# Patient Record
Sex: FEMALE | Race: WHITE | NOT HISPANIC OR LATINO | Employment: FULL TIME | URBAN - METROPOLITAN AREA
[De-identification: names, ages, dates, MRNs, and addresses within clinical notes are randomized per-mention and may not be internally consistent; named-entity substitution may affect disease eponyms.]

---

## 2017-04-10 ENCOUNTER — APPOINTMENT (OUTPATIENT)
Dept: LAB | Age: 57
End: 2017-04-10
Attending: PREVENTIVE MEDICINE

## 2017-04-10 ENCOUNTER — TRANSCRIBE ORDERS (OUTPATIENT)
Dept: ADMINISTRATIVE | Age: 57
End: 2017-04-10

## 2017-04-10 DIAGNOSIS — Z02.1 PRE-EMPLOYMENT HEALTH SCREENING EXAMINATION: Primary | ICD-10-CM

## 2017-04-10 DIAGNOSIS — Z02.1 PRE-EMPLOYMENT HEALTH SCREENING EXAMINATION: ICD-10-CM

## 2017-04-10 PROCEDURE — 86762 RUBELLA ANTIBODY: CPT

## 2017-04-10 PROCEDURE — 86787 VARICELLA-ZOSTER ANTIBODY: CPT

## 2017-04-10 PROCEDURE — 36415 COLL VENOUS BLD VENIPUNCTURE: CPT

## 2017-04-10 PROCEDURE — 86765 RUBEOLA ANTIBODY: CPT

## 2017-04-10 PROCEDURE — 86735 MUMPS ANTIBODY: CPT

## 2017-04-10 PROCEDURE — 86480 TB TEST CELL IMMUN MEASURE: CPT

## 2017-04-11 LAB
MEV IGG SER QL: ABNORMAL
MUV IGG SER QL: NORMAL
RUBV IGG SERPL IA-ACNC: <0.2 IU/ML
VZV IGG SER IA-ACNC: NORMAL

## 2017-04-12 LAB
ANNOTATION COMMENT IMP: NORMAL
GAMMA INTERFERON BACKGROUND BLD IA-ACNC: 0.06 IU/ML
M TB IFN-G BLD-IMP: NEGATIVE
M TB IFN-G CD4+ BCKGRND COR BLD-ACNC: 0.05 IU/ML
M TB IFN-G CD4+ T-CELLS BLD-ACNC: 0.11 IU/ML
MITOGEN IGNF BLD-ACNC: >10 IU/ML
QUANTIFERON-TB GOLD IN TUBE: NORMAL
SERVICE CMNT-IMP: NORMAL

## 2017-06-02 ENCOUNTER — TRANSCRIBE ORDERS (OUTPATIENT)
Dept: LAB | Facility: HOSPITAL | Age: 57
End: 2017-06-02

## 2017-06-02 DIAGNOSIS — Z00.8 ENCOUNTER FOR OTHER GENERAL EXAMINATION: Primary | ICD-10-CM

## 2017-06-05 ENCOUNTER — APPOINTMENT (OUTPATIENT)
Dept: LAB | Facility: HOSPITAL | Age: 57
End: 2017-06-05

## 2017-06-05 DIAGNOSIS — Z00.8 ENCOUNTER FOR OTHER GENERAL EXAMINATION: ICD-10-CM

## 2017-06-05 LAB
CHOLEST SERPL-MCNC: 224 MG/DL (ref 50–200)
EST. AVERAGE GLUCOSE BLD GHB EST-MCNC: 126 MG/DL
HBA1C MFR BLD: 6 % (ref 4.2–6.3)
HDLC SERPL-MCNC: 55 MG/DL (ref 40–60)
LDLC SERPL CALC-MCNC: 136 MG/DL (ref 0–100)
TRIGL SERPL-MCNC: 165 MG/DL

## 2017-06-05 PROCEDURE — 83036 HEMOGLOBIN GLYCOSYLATED A1C: CPT

## 2017-06-05 PROCEDURE — 80061 LIPID PANEL: CPT

## 2017-06-05 PROCEDURE — 36415 COLL VENOUS BLD VENIPUNCTURE: CPT

## 2018-01-11 NOTE — MISCELLANEOUS
Message  spoke to pt re  her varicella zoster igm ab which is elevated  Pt does not have any rash or symptoms   She will call hospice house who has ordered the tests  She will let me know if she needs me to speak with them      Signatures   Electronically signed by : Sandra Blackwood MD; Jan 29 2016  5:41PM EST                       (Author)

## 2018-01-15 NOTE — PROGRESS NOTES
Assessment    1  Encounter for screening mammogram for malignant neoplasm of breast (V76 12)   (Z12 31)   2  Encounter for screening colonoscopy (V76 51) (Z12 11)   3  H/O: upper GI bleed (V12 79) (Z87 19)   4  Fatigue (780 79) (R53 83)   5  Screening for lipoid disorders (V77 91) (Z13 220)   6  Pap smear for cervical cancer screening (V76 2) (Z12 4)   7  Encounter for preventive health examination (V70 0) (Z00 00)   8  History of Chest tightness (786 59) (R07 89)    Plan  Encounter for screening colonoscopy    · 2 - Zack Ac MD, Shaila Ac  Colorectal Surgery, Gastroenterology Physician Referral   Consult  Status: Active  Requested for: 75CLR5384  Care Summary provided  : Yes  Encounter for screening mammogram for malignant neoplasm of breast    · * MAMMO SCREENING BILATERAL W CAD; Status:Active; Requested for:15Jan2016;   Fatigue    · (1) CBC/PLT/DIFF; Status:Active; Requested for:15Jan2016;    · (1) COMPREHENSIVE METABOLIC PANEL; Status:Active; Requested for:15Jan2016;    · (1) TSH WITH FT4 REFLEX; Status:Active; Requested for:15Jan2016;    · (1) VITAMIN D 25-HYDROXY; Status:Active; Requested for:15Jan2016;    Health Maintenance    · Begin a limited exercise program ; Status:Complete;   Done: 94GWV7900   · Drink plenty of fluids ; Status:Complete;   Done: 68PAL7874   · Regular aerobic exercise can help reduce stress ; Status:Complete;   Done: 12VRF6230   · Use a sun block product with an SPF of 15 or more ; Status:Complete;   Done:  03IBF1924   · We recommend a colonoscopy ; Status:Complete;   Done: 94UXO9928   · We recommend routine visits to a dentist ; Status:Complete;   Done: 02WDR4984   · Call [phone] if: You find a new or different kind of lump in your breast ;  Status:Complete;   Done: 85PYA8266   · Call [phone] if: You have any bleeding from the vagina ; Status:Complete;   Done:  82DCD5374   · Call [phone] if: You have any warning signs of skin cancer ; Status:Complete;    Done: 39TIA5485   · Call 911 if: You experience a new kind of chest pain (angina) or pressure ;  Status:Complete;   Done: 85DEU3534  Pap smear for cervical cancer screening    · 1 - Alicia Gallo MD, Alicia Avendaño  (Obstetrics/Gynecology) Physician Referral  Consult   Status: Active  Requested for: 53AFS1774  Care Summary provided  : Yes  Screening for lipoid disorders    · (1) LIPID PANEL FASTING W DIRECT LDL REFLEX; Status:Active; Requested  for:39Kwd3194;     Discussion/Summary  Discussion Summary:   64-year-old female here to establish as a new patient  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 [employer]  She will get mMR, varicella immune testing done  She will get PPD placement at the hospice house  Patient declines flu and tdap vaccines    Re  h/o intermittent chest tightness, pt exam wnl today  Declines ekg or w/u today  Pt states she will schedule a f/u appt for exam with cardiologist at [facility] who she si established with  [facility] cp, sob     Re RHM: script for routine bw and mammogram provided  Gwen Choe ddition, referral to [facility] provided  no prior initial screening colonoscopy, I have provided a referral  Care guide provided  c/w regular exercise, well balanced healthy diet  Pt is currently , going through a divorce, states this is good for her    Followup once bloodwork done  will complete hospice house volunteer paperwork  Counseling Documentation With Imm: The patient was counseled regarding diagnostic results, instructions for management, risk factor reductions, prognosis, patient and family education, impressions, risks and benefits of treatment options, importance of compliance with treatment  Medication SE Review and Pt Understands Tx: Possible side effects of new medications were reviewed with the patient/guardian today  The treatment plan was reviewed with the patient/guardian   The patient/guardian understands and agrees with the treatment plan      Chief Complaint  Chief Complaint Free Text Note Form: new pt to be estab , never had colonoscopy , not up to date with gyn and mammo for a few years      History of Present Illness  HPI: 44-year-old female here to establish as a new patient  last exam, 2009    pt has a h/o systolic click  has seen cardiology assoc at [facility], did stress test 2009, tachycardia set in  Will f/u and schedule appt with cardio for a routine f/u  exercises regularly    gets a constriction/tightness in upper mid chest area, can occur at rest or during activity for past few months  Pt has occasional wheezing  Pt declines ekg    will be volunteering with [facility], needs paperwork completed  Has a script to get bw done for mmr and varicella immunity  She will get ppd placed at the hospice house  Decline tdap and flu vaccines    No previous colonoscopy  No recent mammogram   HM, Adult Female: The patient is being seen for a health maintenance evaluation  General Health: The patient's health since the last visit is described as good  She has regular dental visits  She denies vision problems  Lifestyle:  She consumes a diverse and healthy diet  She exercises regularly  She does not use tobacco  She denies alcohol use  She denies drug use  Reproductive health: the patient is postmenopausal    Screening:      Review of Systems  Complete-Female:   Constitutional: no fever and no chills  Cardiovascular: as noted in HPI, no chest pain and no palpitations  Respiratory: wheezing, but as noted in HPI and no shortness of breath  Past Medical History    1  History of Chest tightness (786 59) (R07 89)   2  H/O: upper GI bleed (V12 79) (Z87 19)   3  History of Systolic click (237 3) (O69 8)    Family History    1  Family history of lung cancer (V16 1) (Z80 1)   2  Family history of Mitral valve prolapse    Social History    · Caffeine use (V49 89) (F15 90)   · Non-smoker (V49 89) (Z78 9)   · Social alcohol use (F10 99)    Current Meds   1  Multivitamins Oral Capsule;    Therapy: 28VOM4508 to Recorded   2  No Reported Medications Recorded    Allergies    1  No Known Drug Allergies    Vitals  Vital Signs [Data Includes: Current Encounter]    Recorded: [id number] 09:56AM Recorded: 03DOG8143 09:51AM   Temperature 97 4 F    Heart Rate 72    Respiration 16    Systolic 565    Diastolic 78    Height 5 ft 2 in 5 ft 2 in   Weight 132 lb  132 lb    BMI Calculated 24 14 24 14   BSA Calculated 1 6 1 6     Physical Exam    Constitutional   General appearance: No acute distress, well appearing and well nourished  Eyes   Conjunctiva and lids: No swelling, erythema or discharge  Ears, Nose, Mouth, and Throat   Oropharynx: Normal with no erythema, edema, exudate or lesions  Pulmonary   Respiratory effort: No increased work of breathing or signs of respiratory distress  Auscultation of lungs: Clear to auscultation  Cardiovascular   Auscultation of heart: Normal rate and rhythm, normal S1 and S2, without murmurs  Examination of extremities for edema and/or varicosities: Normal     Carotid pulses: Normal     Abdomen   Abdomen: Non-tender, no masses  Liver and spleen: No hepatomegaly or splenomegaly  Lymphatic   Palpation of lymph nodes in neck: No lymphadenopathy  Skin   Skin and subcutaneous tissue: Normal without rashes or lesions  Neurologic   Cranial nerves: Cranial nerves 2-12 intact      Psychiatric   Orientation to person, place, and time: Normal     Mood and affect: Normal          Signatures   Electronically signed by : Rei Bartlett MD; Jan 16 2016  3:14PM EST                       (Author)

## 2018-02-18 ENCOUNTER — APPOINTMENT (EMERGENCY)
Dept: RADIOLOGY | Facility: HOSPITAL | Age: 58
End: 2018-02-18
Payer: COMMERCIAL

## 2018-02-18 ENCOUNTER — HOSPITAL ENCOUNTER (EMERGENCY)
Facility: HOSPITAL | Age: 58
Discharge: HOME | End: 2018-02-18
Attending: EMERGENCY MEDICINE | Admitting: EMERGENCY MEDICINE
Payer: COMMERCIAL

## 2018-02-18 VITALS
DIASTOLIC BLOOD PRESSURE: 74 MMHG | OXYGEN SATURATION: 97 % | RESPIRATION RATE: 20 BRPM | BODY MASS INDEX: 22.45 KG/M2 | HEIGHT: 62 IN | TEMPERATURE: 98.3 F | WEIGHT: 122 LBS | HEART RATE: 79 BPM | SYSTOLIC BLOOD PRESSURE: 144 MMHG

## 2018-02-18 DIAGNOSIS — S93.601A SPRAIN OF RIGHT FOOT, INITIAL ENCOUNTER: Primary | ICD-10-CM

## 2018-02-18 PROCEDURE — 73630 X-RAY EXAM OF FOOT: CPT

## 2018-02-18 PROCEDURE — 99283 EMERGENCY DEPT VISIT LOW MDM: CPT

## 2018-02-18 RX ORDER — ACETAMINOPHEN 325 MG/1
975 TABLET ORAL ONCE
Status: DISCONTINUED | OUTPATIENT
Start: 2018-02-18 | End: 2018-02-18 | Stop reason: HOSPADM

## 2018-02-19 NOTE — PROGRESS NOTES
Patient returned the phone call  I explained her that she does have a fracture in her foot  I asked her to return for splint and follow-up Podiatry  She states she feels very comfortable in a shoe and is nonweightbearing with crutches would not like to return at this time but will called podiatrist listed for follow-up the next few days  I explained to her that any time if she has increased pain to return and she would like a splint she return any time she understands and agrees to treatment plan

## 2018-02-19 NOTE — DISCHARGE INSTRUCTIONS
Foot Sprain   WHAT YOU NEED TO KNOW:   A foot sprain is caused by a stretched or torn ligament in the foot or toe  Ligaments are tough tissues that connect bones  DISCHARGE INSTRUCTIONS:   Seek care immediately if:   · You have numbness or tingling below the injury, such as in your toes  · The skin on your injured foot is blue or pale  · You have increased pain, even after you take pain medicine  Contact your healthcare provider if:   · You have new weakness in your foot  · You have new or increased swelling in your foot  · You have new or increased stiffness when you move your injured foot  · You have questions or concerns about your condition or care  Medicines:   · NSAIDs , such as ibuprofen, help decrease swelling, pain, and fever  This medicine is available with or without a doctor's order  NSAIDs can cause stomach bleeding or kidney problems in certain people  If you take blood thinner medicine, always ask if NSAIDs are safe for you  Always read the medicine label and follow directions  Do not give these medicines to children under 10months of age without direction from your child's healthcare provider  · Take your medicine as directed  Contact your healthcare provider if you think your medicine is not helping or if you have side effects  Tell him of her if you are allergic to any medicine  Keep a list of the medicines, vitamins, and herbs you take  Include the amounts, and when and why you take them  Bring the list or the pill bottles to follow-up visits  Carry your medicine list with you in case of an emergency  Self-care:   · Rest your foot  Limit movement in your sprained foot for the first 2 to 3 days  You might need crutches to take weight off your injured foot as it heals  Use crutches as directed  · Apply ice  on your foot for 15 to 20 minutes every hour or as directed  Use an ice pack, or put crushed ice in a plastic bag  Cover it with a towel   Ice helps prevent tissue damage and decreases swelling and pain  · Compress your foot  You may need to use tape or an elastic bandage to support your foot if you have a mild sprain  You may need a splint on your foot for support if your sprain is severe  Wear your splint for as many days as directed  · Elevate your foot  above the level of your heart as often as you can  This will help decrease swelling and pain  Prop your foot on pillows or blankets to keep it elevated comfortably  Exercise your foot:  You may be given exercises to improve your strength and to help decrease stiffness  The exercises and physical therapy can help restore strength and increase the range of motion in your foot  Ask your healthcare provider when you can return to your normal activities or play sports  Prevent another foot sprain:   · Warm up and stretch before you exercise  · Do not exercise when you feel pain or are tired  · Wear equipment to protect yourself when you play sports  Follow up with your healthcare provider as directed:  Write down your questions so you remember to ask them during your visits  © 2017 [employer] Information is for End User's use only and may not be sold, redistributed or otherwise used for commercial purposes  All illustrations and images included in CareNotes® are the copyrighted property of [employer]  or [address]  The above information is an  only  It is not intended as medical advice for individual conditions or treatments  Talk to your doctor, nurse or pharmacist before following any medical regimen to see if it is safe and effective for you

## 2018-02-20 NOTE — ED PROVIDER NOTES
History  Chief Complaint   Patient presents with    Foot Injury     pt states she tripped over her dog last night  c/o right foot pain     60-year-old female presents to the emergency department with complaints of right-sided foot pain  States she tripped over her dog last night and twisted her right foot  States she has had pain with ambulation and some swelling noted in the area since that time  Is using crutches to help with ambulation  Taking over-the-counter medications at home without relief of symptoms  No previous injury to this foot  States she is unable        History provided by:  Patient   used: No        None       No past medical history on file  No past surgical history on file  No family history on file  I have reviewed and agree with the history as documented  Social History   Substance Use Topics    Smoking status: Not on file    Smokeless tobacco: Not on file    Alcohol use Not on file        Review of Systems   Constitutional: Negative for chills and fever  HENT: Negative for congestion, dental problem and sore throat  Respiratory: Negative for cough  Cardiovascular: Negative for chest pain  Gastrointestinal: Negative for abdominal pain  Musculoskeletal: Negative for back pain and neck pain  Foot pain     Skin: Negative for rash and wound  All other systems reviewed and are negative  Physical Exam  ED Triage Vitals [02/18/18 1920]   Temperature Pulse Respirations Blood Pressure SpO2   98 3 °F (36 8 °C) 79 20 144/74 97 %      Temp Source Heart Rate Source Patient Position - Orthostatic VS BP Location FiO2 (%)   Oral Monitor Sitting Left arm --      Pain Score       --           Orthostatic Vital Signs  Vitals:    02/18/18 1920   BP: 144/74   Pulse: 79   Patient Position - Orthostatic VS: Sitting       Physical Exam   Constitutional: She is oriented to person, place, and time   Vital signs are normal  She appears well-developed and well-nourished  HENT:   Head: Normocephalic and atraumatic  Cardiovascular: Normal rate and regular rhythm  Pulmonary/Chest: Effort normal and breath sounds normal  No respiratory distress  She has no wheezes  She has no rhonchi  She has no rales  Musculoskeletal:        Right foot: There is decreased range of motion, tenderness, bony tenderness and swelling  There is normal capillary refill, no crepitus, no deformity and no laceration  Feet:    Neurological: She is alert and oriented to person, place, and time  Skin: Skin is warm and dry  Psychiatric: She has a normal mood and affect  Her behavior is normal    Nursing note and vitals reviewed  ED Medications  Medications - No data to display    Diagnostic Studies  Results Reviewed     None                 XR foot 3+ views RIGHT   ED Interpretation by Malissa Thorne PA-C (02/18 2116)   No fracture      Final Result by Jose Matt DO (02/19 1019)   Nondisplaced fractures bases of 2nd and 3rd metatarsal bones  I personally discussed this study with Jazzy Smith on 2/19/2018 at 10:18 AM                Workstation performed: [id number]                    Procedures  Procedures  To fit issue with this time  Phone Contacts  ED Phone Contact    ED Course  ED Course                                MDM  Number of Diagnoses or Management Options  Sprain of right foot, initial encounter:   Diagnosis management comments: Differential diagnosis includes but not limited to: Foot sprain, foot fracture         Amount and/or Complexity of Data Reviewed  Tests in the radiology section of CPT®: ordered and reviewed  Independent visualization of images, tracings, or specimens: yes      CritCare Time    Disposition  Final diagnoses:   Sprain of right foot, initial encounter     Time reflects when diagnosis was documented in both MDM as applicable and the Disposition within this note     Time User Action Codes Description Comment 2/18/2018  9:16 PM Amilcar Navarrete Add [S93 601A] Sprain of right foot, initial encounter       ED Disposition     ED Disposition Condition Comment    Discharge  [address] discharge to home/self care  Condition at discharge: Stable        Follow-up Information     Follow up With Specialties Details Why Contact Info    Lul Torres DPM Podiatry Schedule an appointment as soon as possible for a visit  [address]  [place] [address]  [phone]          There are no discharge medications for this patient  No discharge procedures on file      ED Provider  Electronically Signed by           Clyde Contreras PA-C  02/19/18 6844

## 2018-04-26 ENCOUNTER — EVALUATION (OUTPATIENT)
Dept: PHYSICAL THERAPY | Facility: REHABILITATION | Age: 58
End: 2018-04-26
Payer: COMMERCIAL

## 2018-04-26 DIAGNOSIS — S92.334D CLOSED NONDISPLACED FRACTURE OF THIRD METATARSAL BONE OF RIGHT FOOT WITH ROUTINE HEALING, SUBSEQUENT ENCOUNTER: ICD-10-CM

## 2018-04-26 DIAGNOSIS — S92.324D CLOSED NONDISPLACED FRACTURE OF SECOND METATARSAL BONE OF RIGHT FOOT WITH ROUTINE HEALING, SUBSEQUENT ENCOUNTER: Primary | ICD-10-CM

## 2018-04-26 PROCEDURE — G8979 MOBILITY GOAL STATUS: HCPCS | Performed by: PHYSICAL THERAPIST

## 2018-04-26 PROCEDURE — 97162 PT EVAL MOD COMPLEX 30 MIN: CPT | Performed by: PHYSICAL THERAPIST

## 2018-04-26 PROCEDURE — G8978 MOBILITY CURRENT STATUS: HCPCS | Performed by: PHYSICAL THERAPIST

## 2018-04-26 NOTE — PROGRESS NOTES
PT Evaluation     Today's date: 2018  Patient name: Nilsa Grubbs  : 1960  MRN: [id number]  Referring provider: Rocio Valentin DPM  Dx:   Encounter Diagnosis     ICD-10-CM    1  Closed nondisplaced fracture of second metatarsal bone of right foot with routine healing, subsequent encounter S92 324D    2  Closed nondisplaced fracture of third metatarsal bone of right foot with routine healing, subsequent encounter S92 334D        Start Time: 1500  Stop Time: 1600  Total time in clinic (min): 60 minutes    Assessment  Impairments: abnormal coordination, abnormal gait, abnormal muscle firing, abnormal or restricted ROM, abnormal movement, activity intolerance, impaired balance, impaired physical strength, lacks appropriate home exercise program, pain with function, safety issue and weight-bearing intolerance    Assessment details: Nilsa Grubbs is a 63 y o  female who presents with pain, decreased strength, decreased ROM, decreased joint mobility and ambulatory dysfunction  Due to these impairments, Patient has difficulty performing a/iadls, recreational activities and engaging in social activities  Patient's clinical presentation is consistent with their referring diagnosis of s/p right 2nd, 3rd metatarsal fracture  Patient would benefit from skilled physical therapy to address their aforementioned impairments, improve their level of function and to improve their overall quality of life  Understanding of Dx/Px/POC: excellent  Goals  Short Term Goals: to be achieved in 4 weeks  1) Patient to be independent with basic HEP  2) Decrease pain at it's worst to 3/10 on VAS  3) Increase LE strength by 1/2 MMT grade in all deficient planes  4) Patient to report decreased sleep interruption secondary to pain  5) Patient to negotiate steps with a step-to pattern without use of HR  6) Increase ambulatory tolerance to 40 minutes      Long Term Goals: to be achieved by discharge  1) FOTO equal to or greater than   2) Patient to be independent with comprehensive HEP  3) Abolish pain for improved quality of life  4) Increase LE strength to 5/5 MMT grade in all planes to improve A/IADLS  5) Patient to negotiate steps with a reciprocal pattern without use of Hr  6) Increase ambulatory tolerance to 60 minutes  7) Patient to report no sleep interruption secondary to pain  Plan  Patient would benefit from: skilled PT  Planned modality interventions: biofeedback, cryotherapy, hydrotherapy, TENS and unattended electrical stimulation  Planned therapy interventions: activity modification, ADL retraining, balance, balance/weight bearing training, behavior modification, body mechanics training, functional ROM exercises, gait training, home exercise program, IADL retraining, joint mobilization, manual therapy, massage, neuromuscular re-education, patient education, strengthening, stretching, therapeutic activities, therapeutic exercise and transfer training  Frequency: 2-3x week  Duration in weeks: 12  Treatment plan discussed with: patient        Subjective Evaluation    History of Present Illness  Onset date: February 2018  Mechanism of injury: trauma  Mechanism of injury: Patient reports that in February around Presidents day she was walking in the dark in her room when she tripped over her dog  Patient's foot was stuck beneath her dog as she fell forward  Patient experienced immediate onset of pain and was initially unable to walk  The following day Patient continued to experience severe pain and increase in swelling  Patient went to the ER where radiographic imaging identified a right 2nd and 3rd metatarsal fracture  Patient was given a post-op shoe and eventually transitioned to a camboot the following week  Patient remained in walking boot and ambulated with a unilateral crutch for approximately 6 weeks  Patient did use scooter for approximately 3 weeks   Patient has since transitioned to a shoe over the past 2-3 weeks  Per Patient report, recent radiographs demonstrate good healing, but evidence of fracture still remains  Patient states that she has mild tingling/numbness over her right forefoot and weakness, which makes her foot unstable in weight-bearing  Patient feels like she has been compensating while walking by weight-bearing on the outside of her foot  Patient's swelling continues to fluctuate  Pain  Current pain rating: 3  At best pain ratin  At worst pain ratin  Location: right forefoot (metatarsals)  Quality: dull ache  Alleviating factors: rest, elevation, ice  Exacerbated by: weight-bearing, ambulating, walking over uneven ground, walking barefoot, negotiating steps      Social Support    Employment status: working ()    Diagnostic Tests  X-ray: abnormal  Treatments  Previous treatment: immobilization  Patient Goals  Patient goals for therapy: return to sport/leisure activities, increased strength, independence with ADLs/IADLs, increased motion, improved balance, decreased pain and decreased edema          Objective     Active Range of Motion   Left Ankle/Foot   Dorsiflexion (ke): 0 degrees   Dorsiflexion (kf): 4 degrees   Plantar flexion: 70 degrees   Inversion: 33 degrees   Eversion: 12 degrees   Great toe extension: 40 degrees     Right Ankle/Foot   Dorsiflexion (ke): 0 degrees   Dorsiflexion (kf): 0 degrees   Plantar flexion: 65 degrees   Inversion: 35 degrees   Eversion: 0 degrees   Great toe extension: 0 degrees     Passive Range of Motion   Left Ankle/Foot    Dorsiflexion (ke): 11 degrees   Dorsiflexion (kf): 12 degrees   Plantar flexion: 75 degrees   Inversion: 45 degrees   Eversion: 18 degrees   Great toe extension: 70 degrees     Right Ankle/Foot    Dorsiflexion (ke): 5 degrees   Dorsiflexion (kf): 7 degrees    Plantar flexion: 70 degrees   Inversion: 45 degrees   Eversion: 2 degrees   Great toe extension: 60 degrees     Joint Play   Left Ankle/Foot  Joints within functional limits are the talocrural joint  Right Ankle/Foot  Hypomobile in the talocrural joint  Strength/Myotome Testing     Left Hip   Planes of Motion   Flexion: 5  Extension: 5  Abduction: 5    Right Hip   Planes of Motion   Flexion: 5  Extension: 5  Abduction: 5    Left Knee   Flexion: 5  Extension: 5    Right Knee   Flexion: 5  Extension: 5    Left Ankle/Foot   Dorsiflexion: 5  Inversion: 5  Eversion: 5    Right Ankle/Foot   Dorsiflexion: 3+  Plantar flexion: 3+  Inversion: 4-  Eversion: 4-    Swelling   Left Ankle/Foot   Metatarsal heads: 20 cm  Figure 8: 47 cm  [place]: 23 4 cm    Right Ankle/Foot   Metatarsal heads: 21 3 cm  Figure 8: 48 5 cm  [place]: 23 8 cm    Ambulation   Weight-Bearing Status   Weight-Bearing Status (Left): full weight bearing   Weight-Bearing Status (Right): full weight-bearing    Assistive device used: none    Ambulation: Level Surfaces   Ambulation without assistive device: independent    Ambulation: Stairs   Ascend stairs: independent    Observational Gait   Gait: antalgic   Left stance time and right step length within functional limits  Decreased right stance time, left swing time and left step length  Right stance time comments: decreased push-off at terminal stance    Functional Assessment     Forward Step Up 6"   Left Leg  Within functional limits  Right Leg  Within functional limits  Comments  FSD 6" step: increased anterior ankle pain localized over TCJ, early HR, poor eccentric control, use of HR      Flowsheet Rows      Most Recent Value   PT/OT G-Codes   Current Score  53   Projected Score  71   FOTO information reviewed  Yes   Assessment Type  Evaluation   G code set  Mobility: Walking & Moving Around   Mobility: Walking and Moving Around Current Status ()  CJ   Mobility: Walking and Moving Around Goal Status ()  CI          Precautions: right 2nd, 3rd metatarsal fx      Daily Treatment Diary     Manual  4/26            Right gastroc, soleus str              Right great toe ext  str  Right Gr  II-IV TCJ AP/PA mobs                                           Exercise Diary  4/26            VG             Long-sitting gastroc, soleus str               4-way ankle with TB vs  isometrics             Seated HR             SLS             FSU             FSD             Squats             Rockerboard: AP rocking             Rockerboard: AP balance             Biodex                                                                                                                                      Modalities

## 2018-05-02 ENCOUNTER — OFFICE VISIT (OUTPATIENT)
Dept: PHYSICAL THERAPY | Facility: REHABILITATION | Age: 58
End: 2018-05-02
Payer: COMMERCIAL

## 2018-05-02 DIAGNOSIS — S92.324D CLOSED NONDISPLACED FRACTURE OF SECOND METATARSAL BONE OF RIGHT FOOT WITH ROUTINE HEALING, SUBSEQUENT ENCOUNTER: Primary | ICD-10-CM

## 2018-05-02 DIAGNOSIS — S92.334D CLOSED NONDISPLACED FRACTURE OF THIRD METATARSAL BONE OF RIGHT FOOT WITH ROUTINE HEALING, SUBSEQUENT ENCOUNTER: ICD-10-CM

## 2018-05-02 PROCEDURE — 97140 MANUAL THERAPY 1/> REGIONS: CPT | Performed by: PHYSICAL THERAPIST

## 2018-05-02 PROCEDURE — 97110 THERAPEUTIC EXERCISES: CPT | Performed by: PHYSICAL THERAPIST

## 2018-05-02 NOTE — PROGRESS NOTES
Daily Note     Today's date: 2018  Patient name: Zev Thompson  : 1960  MRN: [id number]  Referring provider: Lisbet Joy DPM  Dx:   Encounter Diagnosis     ICD-10-CM    1  Closed nondisplaced fracture of second metatarsal bone of right foot with routine healing, subsequent encounter S92 324D    2  Closed nondisplaced fracture of third metatarsal bone of right foot with routine healing, subsequent encounter S92 334D        Start Time:   Stop Time:   Total time in clinic (min): 55 minutes    Subjective: Patient reports that she has been performing her HEP and her ankle feels looser with less pain while negotiating steps over her anterior ankle  Patient reports that she has more pain towards the end of the day  Objective: See treatment diary below      Assessment: Tolerated treatment well  Patient demonstrated fatigue post treatment, exhibited good technique with therapeutic exercises and would benefit from continued PT  Patient reporting improved ambulatory tolerance with greater ability to pronate foot following manual techniques  Decreased antalgia and improved stride length noted  Plan: Continue per plan of care  Progress treatment as tolerated  Precautions: right 2nd, 3rd metatarsal fx  Daily Treatment Diary     Manual             Right gastroc, soleus str  GR           Right great toe ext  str  GR           Right Gr  II-IV TCJ AP/PA mobs  GR           Subtalar Gr  II-IV mobs  GR           4-way submaximal isometrics  GR               Exercise Diary             VG -L7  7'            Long-sitting gastroc, soleus str  HEP (with towel)           4-way ankle with TB vs  isometrics             Seated HR             SLS             FSU  5x           FSD  5x           Squats             Rockerboard: AP rocking             Rockerboard: AP balance             Biodex             Runner's str  (gastroc, soleus)  30" ea  Modalities

## 2018-05-10 ENCOUNTER — OFFICE VISIT (OUTPATIENT)
Dept: PHYSICAL THERAPY | Facility: REHABILITATION | Age: 58
End: 2018-05-10
Payer: COMMERCIAL

## 2018-05-10 DIAGNOSIS — S92.334D CLOSED NONDISPLACED FRACTURE OF THIRD METATARSAL BONE OF RIGHT FOOT WITH ROUTINE HEALING, SUBSEQUENT ENCOUNTER: ICD-10-CM

## 2018-05-10 DIAGNOSIS — S92.324D CLOSED NONDISPLACED FRACTURE OF SECOND METATARSAL BONE OF RIGHT FOOT WITH ROUTINE HEALING, SUBSEQUENT ENCOUNTER: Primary | ICD-10-CM

## 2018-05-10 PROCEDURE — 97140 MANUAL THERAPY 1/> REGIONS: CPT | Performed by: PHYSICAL THERAPIST

## 2018-05-10 PROCEDURE — 97112 NEUROMUSCULAR REEDUCATION: CPT | Performed by: PHYSICAL THERAPIST

## 2018-05-10 PROCEDURE — 97110 THERAPEUTIC EXERCISES: CPT | Performed by: PHYSICAL THERAPIST

## 2018-05-10 NOTE — PROGRESS NOTES
Daily Note     Today's date: 5/10/2018  Patient name: Marcelina Langley  : 1960  MRN: [id number]  Referring provider: Paula Hendrix DPM  Dx:   Encounter Diagnosis     ICD-10-CM    1  Closed nondisplaced fracture of second metatarsal bone of right foot with routine healing, subsequent encounter S92 324D    2  Closed nondisplaced fracture of third metatarsal bone of right foot with routine healing, subsequent encounter S92 334D        Start Time:   Stop Time:   Total time in clinic (min): 75 minutes    Subjective: Patient reports that her ankle felt much more loose following her previous treatment session, but later that night into the next day she had increased pain  The following day she had less pain and much improved mobility, stating that she felt at times she was walking much better without a limp  Objective: See treatment diary below      Assessment: Tolerated treatment well  Patient demonstrated fatigue post treatment and would benefit from continued PT  Patient tolerated progressions well, reporting improved quality of gait and greater weight-bearing tolerance  Patient able to balance in single limb stance at end of session  Mild discomfort noted into IR/ER strengthening  Plan: Continue per plan of care  Progress treatment as tolerated  Precautions: right 2nd, 3rd metatarsal fx  Daily Treatment Diary     Manual  4/26 5/2 5/10          Right gastroc, soleus str  GR GR          Right great toe ext  str  GR GR          Right Gr  II-IV TCJ AP/PA mobs  GR GR          Subtalar Gr  II-IV mobs  GR GR          4-way submaximal isometrics  GR               Exercise Diary  4/26 5/2 5/10          VG -L7  7'            Long-sitting gastroc, soleus str  HEP (with towel)           4-way ankle with TB vs  isometrics   30x3" OTB ea            Seated HR             SLS             FSU  5x           FSD  5x           Squats             Rockerboard: AP rocking   30x2          Rockerboard: AP balance   5x30"          Biodex             Runner's str  (gastroc, soleus)  30" ea  3x30" ea   b/l                                                                                                                      Modalities

## 2018-05-14 ENCOUNTER — APPOINTMENT (OUTPATIENT)
Dept: PHYSICAL THERAPY | Facility: REHABILITATION | Age: 58
End: 2018-05-14
Payer: COMMERCIAL

## 2018-05-16 ENCOUNTER — OFFICE VISIT (OUTPATIENT)
Dept: PHYSICAL THERAPY | Facility: REHABILITATION | Age: 58
End: 2018-05-16
Payer: COMMERCIAL

## 2018-05-16 DIAGNOSIS — S92.334D CLOSED NONDISPLACED FRACTURE OF THIRD METATARSAL BONE OF RIGHT FOOT WITH ROUTINE HEALING, SUBSEQUENT ENCOUNTER: ICD-10-CM

## 2018-05-16 DIAGNOSIS — S92.324D CLOSED NONDISPLACED FRACTURE OF SECOND METATARSAL BONE OF RIGHT FOOT WITH ROUTINE HEALING, SUBSEQUENT ENCOUNTER: Primary | ICD-10-CM

## 2018-05-16 PROCEDURE — 97140 MANUAL THERAPY 1/> REGIONS: CPT | Performed by: PHYSICAL THERAPIST

## 2018-05-16 PROCEDURE — 97112 NEUROMUSCULAR REEDUCATION: CPT | Performed by: PHYSICAL THERAPIST

## 2018-05-16 PROCEDURE — 97110 THERAPEUTIC EXERCISES: CPT | Performed by: PHYSICAL THERAPIST

## 2018-05-16 PROCEDURE — 97530 THERAPEUTIC ACTIVITIES: CPT | Performed by: PHYSICAL THERAPIST

## 2018-05-16 NOTE — PROGRESS NOTES
Daily Note     Today's date: 2018  Patient name: Lynn Benavides  : 1960  MRN: [id number]  Referring provider: Angela Chung DPM  Dx:   Encounter Diagnosis     ICD-10-CM    1  Closed nondisplaced fracture of second metatarsal bone of right foot with routine healing, subsequent encounter S92 324D    2  Closed nondisplaced fracture of third metatarsal bone of right foot with routine healing, subsequent encounter S92 334D        Start Time: 1045  Stop Time: 0  Total time in clinic (min): 70 minutes    Subjective: Patient reports that she felt much better following her previous treatment session and didn't have increased pain  Patient did experience increased pain and swelling after she finished mowing her grass, but that has since resolved  Objective: See treatment diary below      Assessment: Tolerated treatment well  Patient demonstrated fatigue post treatment, exhibited good technique with therapeutic exercises and would benefit from continued PT  Patient reporting "I feel like I can walk with better dexterity" following treatment session  Patient with mild pain/fatigue with SLS  Plan: Continue per plan of care  Progress treatment as tolerated  recautions: right 2nd, 3rd metatarsal fx  Daily Treatment Diary     Manual  4/26 5/2 5/10 5/16         Right gastroc, soleus str  GR GR          Right great toe ext  str  GR GR          Right Gr  II-IV TCJ AP/PA mobs  GR GR          Subtalar Gr  II-IV mobs  GR GR          4-way submaximal isometrics  GR               Exercise Diary  4/26 5/2 5/10 5/16         VG -L7  7'   7'         Long-sitting gastroc, soleus str  HEP (with towel)           4-way ankle with TB vs  isometrics   30x3" OTB ea            Standing HR    2x10         SLS with MLA shortening    10x10"         FSU  5x  3x10 8"         FSD  5x  2x10 4"         Squats    3x10         Rockerboard: AP rocking   30x2 2x30         Rockerboard: AP balance   5x30" 3x30"         Biodex Runner's str  (gastroc, soleus)  30" ea  3x30" ea  b/l 2x30"         Slantboard calf str      3x30"                                                                                                        Modalities

## 2018-05-21 ENCOUNTER — APPOINTMENT (OUTPATIENT)
Dept: PHYSICAL THERAPY | Facility: REHABILITATION | Age: 58
End: 2018-05-21
Payer: COMMERCIAL

## 2018-05-23 ENCOUNTER — OFFICE VISIT (OUTPATIENT)
Dept: PHYSICAL THERAPY | Facility: REHABILITATION | Age: 58
End: 2018-05-23
Payer: COMMERCIAL

## 2018-05-23 DIAGNOSIS — S92.324D CLOSED NONDISPLACED FRACTURE OF SECOND METATARSAL BONE OF RIGHT FOOT WITH ROUTINE HEALING, SUBSEQUENT ENCOUNTER: Primary | ICD-10-CM

## 2018-05-23 DIAGNOSIS — S92.334D CLOSED NONDISPLACED FRACTURE OF THIRD METATARSAL BONE OF RIGHT FOOT WITH ROUTINE HEALING, SUBSEQUENT ENCOUNTER: ICD-10-CM

## 2018-05-23 PROCEDURE — 97140 MANUAL THERAPY 1/> REGIONS: CPT | Performed by: PHYSICAL THERAPIST

## 2018-05-23 PROCEDURE — G8978 MOBILITY CURRENT STATUS: HCPCS | Performed by: PHYSICAL THERAPIST

## 2018-05-23 PROCEDURE — G8979 MOBILITY GOAL STATUS: HCPCS | Performed by: PHYSICAL THERAPIST

## 2018-05-23 PROCEDURE — 97110 THERAPEUTIC EXERCISES: CPT | Performed by: PHYSICAL THERAPIST

## 2018-05-23 NOTE — PROGRESS NOTES
Daily Note     Today's date: 2018  Patient name: Clyde Ayers  : 1960  MRN: [id number]  Referring provider: Renzo Dale DPM  Dx:   Encounter Diagnosis     ICD-10-CM    1  Closed nondisplaced fracture of second metatarsal bone of right foot with routine healing, subsequent encounter S92 324D    2  Closed nondisplaced fracture of third metatarsal bone of right foot with routine healing, subsequent encounter S92 334D        Start Time: 60  Stop Time:   Total time in clinic (min): 65 minutes    Subjective: Patient reports that mowing her grass continues to be painful and her foot swells afterwards  Patient states that her foot overall feels better, however  Objective: See treatment diary below      Assessment: Tolerated treatment well  Patient demonstrated fatigue post treatment, exhibited good technique with therapeutic exercises and would benefit from continued PT  Patient reeducated regarding activity restrictions and modifications to avoid exacerbating foot pain during a/iadls  Plan: Continue per plan of care  Progress treatment as tolerated  Precautions: right 2nd, 3rd metatarsal fx  Daily Treatment Diary     Manual  4/26 5/2 5/10 5/16 5/23        Right gastroc, soleus str  GR GR GR GR        Right great toe ext  str  GR GR GR GR        Right Gr  II-IV TCJ AP/PA mobs  GR GR GR GR        Subtalar Gr  II-IV mobs  GR GR GR GR        4-way submaximal isometrics  GR               Exercise Diary  4/26 5/2 5/10 5/16 5/23        VG -L7  7'   7' 7'        Long-sitting gastroc, soleus str  HEP (with towel)           4-way ankle with TB vs  isometrics   30x3" OTB ea  30x3" OTB ea  Standing HR    2x10 2x10        SLS with MLA shortening    10x10"         FSU  5x  3x10 8"         FSD  5x  2x10 4"         Squats    3x10         Rockerboard: AP rocking   30x2 2x30         Rockerboard: AP balance   5x30" 3x30"         Biodex             Runner's str  (gastroc, soleus)  30" ea  3x30" ea  b/l 2x30"         Slantboard calf str      3x30" 5x30"                                                                                                       Modalities

## 2018-05-30 ENCOUNTER — OFFICE VISIT (OUTPATIENT)
Dept: PHYSICAL THERAPY | Facility: REHABILITATION | Age: 58
End: 2018-05-30
Payer: COMMERCIAL

## 2018-05-30 DIAGNOSIS — S92.324D CLOSED NONDISPLACED FRACTURE OF SECOND METATARSAL BONE OF RIGHT FOOT WITH ROUTINE HEALING, SUBSEQUENT ENCOUNTER: Primary | ICD-10-CM

## 2018-05-30 DIAGNOSIS — S92.334D CLOSED NONDISPLACED FRACTURE OF THIRD METATARSAL BONE OF RIGHT FOOT WITH ROUTINE HEALING, SUBSEQUENT ENCOUNTER: ICD-10-CM

## 2018-05-30 PROCEDURE — 97112 NEUROMUSCULAR REEDUCATION: CPT | Performed by: PHYSICAL THERAPIST

## 2018-05-30 PROCEDURE — 97140 MANUAL THERAPY 1/> REGIONS: CPT | Performed by: PHYSICAL THERAPIST

## 2018-05-30 PROCEDURE — G8979 MOBILITY GOAL STATUS: HCPCS | Performed by: PHYSICAL THERAPIST

## 2018-05-30 PROCEDURE — G8980 MOBILITY D/C STATUS: HCPCS | Performed by: PHYSICAL THERAPIST

## 2018-05-30 PROCEDURE — 97110 THERAPEUTIC EXERCISES: CPT | Performed by: PHYSICAL THERAPIST

## 2018-05-30 NOTE — PROGRESS NOTES
Daily Note     Today's date: 2018  Patient name: Ansley Dickson  : 1960  MRN: [id number]  Referring provider: Cheryle Restrepo DPM  Dx:   Encounter Diagnosis     ICD-10-CM    1  Closed nondisplaced fracture of second metatarsal bone of right foot with routine healing, subsequent encounter S92 324D    2  Closed nondisplaced fracture of third metatarsal bone of right foot with routine healing, subsequent encounter S92 334D        Start Time: 8000  Stop Time: 1845  Total time in clinic (min): 75 minutes    Subjective: Patient reports that her feet have had minimal pain recently, just stiffness  Objective: See treatment diary below      Assessment: Tolerated treatment well  Patient demonstrated fatigue post treatment, exhibited good technique with therapeutic exercises and would benefit from continued PT  Patient demonstrating excellent eccentric control and ankle ROM descending 8" step  Patient with improving balance/proprioception, but continues to fatigue quickly  Patient reporting decreased stiffness and improved weight-bearing tolerance at end of session  Plan: Continue per plan of care  Progress treatment as tolerated  Precautions: right 2nd, 3rd metatarsal fx  Daily Treatment Diary     Manual  4/26 5/2 5/10 5/16 5/23 5/30       Right gastroc, soleus str  GR GR GR GR GR       Right great toe ext  str  GR GR GR GR GR       Right Gr  II-IV TCJ AP/PA mobs  GR GR GR GR GR       Subtalar Gr  II-IV mobs  GR GR GR GR GR       4-way submaximal isometrics  GR               Exercise Diary  4/26 5/2 5/10 5/16 5/23 5/30       VG -L7  7'   7' 7' 7'       Long-sitting gastroc, soleus str  HEP (with towel)           4-way ankle with TB vs  isometrics   30x3" OTB ea  30x3" OTB ea          Standing HR    2x10 2x10 3x10       SLS with MLA shortening    10x10"  10x10"       FSU  5x  3x10 8"  BOSU  3x10       FSD  5x  2x10 4"  3x10 8"       Squats    3x10         Rockerboard: AP rocking   30x2 2x30 Rockerboard: AP balance   5x30" 3x30"         Biodex             Runner's str  (gastroc, soleus)  30" ea  3x30" ea  b/l 2x30"         Slantboard calf str      3x30" 5x30" 5x30"       SLS Airex      5x20"                                                                                         Modalities

## 2018-07-11 NOTE — PROGRESS NOTES
Dong Beal has attended a total of 6 physical therapy appointments to date  Patient was last treated on 5/30/18 and has no remaining appointments scheduled  Goals, objective and subjective information unable to be updated at this time  Patient will be discharged from physical therapy secondary to noncompliance with plan of care

## 2022-02-05 ENCOUNTER — APPOINTMENT (OUTPATIENT)
Dept: RADIOLOGY | Facility: HOSPITAL | Age: 62
End: 2022-02-05
Payer: COMMERCIAL

## 2022-02-05 ENCOUNTER — HOSPITAL ENCOUNTER (OUTPATIENT)
Facility: HOSPITAL | Age: 62
Setting detail: OBSERVATION
Discharge: HOME | End: 2022-02-06
Attending: EMERGENCY MEDICINE | Admitting: INTERNAL MEDICINE
Payer: COMMERCIAL

## 2022-02-05 DIAGNOSIS — K92.1 BLACK STOOLS: ICD-10-CM

## 2022-02-05 DIAGNOSIS — K92.2 GI BLEED: ICD-10-CM

## 2022-02-05 DIAGNOSIS — R19.7 DIARRHEA: Primary | ICD-10-CM

## 2022-02-05 DIAGNOSIS — R11.2 NAUSEA & VOMITING: ICD-10-CM

## 2022-02-05 PROBLEM — R74.8 ELEVATED LIVER ENZYMES: Status: ACTIVE | Noted: 2022-02-05

## 2022-02-05 PROBLEM — D64.9 ANEMIA: Status: ACTIVE | Noted: 2022-02-05

## 2022-02-05 LAB
ABO GROUP BLD: NORMAL
ABO GROUP BLD: NORMAL
ALBUMIN SERPL BCP-MCNC: 2.8 G/DL (ref 3.5–5)
ALP SERPL-CCNC: 128 U/L (ref 46–116)
ALT SERPL W P-5'-P-CCNC: 38 U/L (ref 12–78)
ANION GAP SERPL CALCULATED.3IONS-SCNC: 10 MMOL/L (ref 4–13)
APTT PPP: 24 SECONDS (ref 23–37)
AST SERPL W P-5'-P-CCNC: 83 U/L (ref 5–45)
BASOPHILS # BLD AUTO: 0.05 THOUSANDS/ΜL (ref 0–0.1)
BASOPHILS NFR BLD AUTO: 1 % (ref 0–1)
BILIRUB SERPL-MCNC: 2.18 MG/DL (ref 0.2–1)
BLD GP AB SCN SERPL QL: NEGATIVE
BUN SERPL-MCNC: 22 MG/DL (ref 5–25)
CALCIUM ALBUM COR SERPL-MCNC: 10.5 MG/DL (ref 8.3–10.1)
CALCIUM SERPL-MCNC: 9.5 MG/DL (ref 8.3–10.1)
CHLORIDE SERPL-SCNC: 108 MMOL/L (ref 100–108)
CO2 SERPL-SCNC: 24 MMOL/L (ref 21–32)
CREAT SERPL-MCNC: 0.6 MG/DL (ref 0.6–1.3)
EOSINOPHIL # BLD AUTO: 0 THOUSAND/ΜL (ref 0–0.61)
EOSINOPHIL NFR BLD AUTO: 0 % (ref 0–6)
ERYTHROCYTE [DISTWIDTH] IN BLOOD BY AUTOMATED COUNT: 13.9 % (ref 11.6–15.1)
FERRITIN SERPL-MCNC: 620 NG/ML (ref 8–388)
GFR SERPL CREATININE-BSD FRML MDRD: 99 ML/MIN/1.73SQ M
GLUCOSE SERPL-MCNC: 130 MG/DL (ref 65–140)
HAV IGM SER QL: NORMAL
HBV CORE AB SER QL: NORMAL
HBV CORE IGM SER QL: NORMAL
HBV CORE IGM SER QL: NORMAL
HBV SURFACE AG SER QL: NORMAL
HBV SURFACE AG SER QL: NORMAL
HCT VFR BLD AUTO: 33.8 % (ref 34.8–46.1)
HCV AB SER QL: NORMAL
HCV AB SER QL: NORMAL
HGB BLD-MCNC: 11 G/DL (ref 11.5–15.4)
HGB BLD-MCNC: 9.3 G/DL (ref 11.5–15.4)
IMM GRANULOCYTES # BLD AUTO: 0.07 THOUSAND/UL (ref 0–0.2)
IMM GRANULOCYTES NFR BLD AUTO: 1 % (ref 0–2)
INR PPP: 1.4 (ref 0.84–1.19)
IRON SATN MFR SERPL: 93 % (ref 15–50)
IRON SERPL-MCNC: 151 UG/DL (ref 50–170)
LACTATE SERPL-SCNC: 1.7 MMOL/L (ref 0.5–2)
LACTATE SERPL-SCNC: 2.2 MMOL/L (ref 0.5–2)
LACTATE SERPL-SCNC: 3.5 MMOL/L (ref 0.5–2)
LIPASE SERPL-CCNC: 61 U/L (ref 73–393)
LYMPHOCYTES # BLD AUTO: 0.6 THOUSANDS/ΜL (ref 0.6–4.47)
LYMPHOCYTES NFR BLD AUTO: 6 % (ref 14–44)
MCH RBC QN AUTO: 32.4 PG (ref 26.8–34.3)
MCHC RBC AUTO-ENTMCNC: 32.5 G/DL (ref 31.4–37.4)
MCV RBC AUTO: 100 FL (ref 82–98)
MONOCYTES # BLD AUTO: 0.43 THOUSAND/ΜL (ref 0.17–1.22)
MONOCYTES NFR BLD AUTO: 4 % (ref 4–12)
NEUTROPHILS # BLD AUTO: 9.28 THOUSANDS/ΜL (ref 1.85–7.62)
NEUTS SEG NFR BLD AUTO: 88 % (ref 43–75)
NRBC BLD AUTO-RTO: 0 /100 WBCS
PLATELET # BLD AUTO: 104 THOUSANDS/UL (ref 149–390)
PMV BLD AUTO: 10.9 FL (ref 8.9–12.7)
POTASSIUM SERPL-SCNC: 3.2 MMOL/L (ref 3.5–5.3)
PROT SERPL-MCNC: 7.5 G/DL (ref 6.4–8.2)
PROTHROMBIN TIME: 16.6 SECONDS (ref 11.6–14.5)
RBC # BLD AUTO: 3.39 MILLION/UL (ref 3.81–5.12)
RH BLD: POSITIVE
RH BLD: POSITIVE
SODIUM SERPL-SCNC: 142 MMOL/L (ref 136–145)
SPECIMEN EXPIRATION DATE: NORMAL
TIBC SERPL-MCNC: 163 UG/DL (ref 250–450)
WBC # BLD AUTO: 10.43 THOUSAND/UL (ref 4.31–10.16)

## 2022-02-05 PROCEDURE — C9113 INJ PANTOPRAZOLE SODIUM, VIA: HCPCS | Performed by: EMERGENCY MEDICINE

## 2022-02-05 PROCEDURE — 83605 ASSAY OF LACTIC ACID: CPT | Performed by: STUDENT IN AN ORGANIZED HEALTH CARE EDUCATION/TRAINING PROGRAM

## 2022-02-05 PROCEDURE — 86901 BLOOD TYPING SEROLOGIC RH(D): CPT | Performed by: EMERGENCY MEDICINE

## 2022-02-05 PROCEDURE — 36415 COLL VENOUS BLD VENIPUNCTURE: CPT

## 2022-02-05 PROCEDURE — 99285 EMERGENCY DEPT VISIT HI MDM: CPT | Performed by: EMERGENCY MEDICINE

## 2022-02-05 PROCEDURE — 83605 ASSAY OF LACTIC ACID: CPT | Performed by: EMERGENCY MEDICINE

## 2022-02-05 PROCEDURE — 93005 ELECTROCARDIOGRAM TRACING: CPT

## 2022-02-05 PROCEDURE — 96361 HYDRATE IV INFUSION ADD-ON: CPT

## 2022-02-05 PROCEDURE — 96366 THER/PROPH/DIAG IV INF ADDON: CPT

## 2022-02-05 PROCEDURE — 85730 THROMBOPLASTIN TIME PARTIAL: CPT | Performed by: EMERGENCY MEDICINE

## 2022-02-05 PROCEDURE — 82272 OCCULT BLD FECES 1-3 TESTS: CPT

## 2022-02-05 PROCEDURE — 74177 CT ABD & PELVIS W/CONTRAST: CPT

## 2022-02-05 PROCEDURE — G1004 CDSM NDSC: HCPCS

## 2022-02-05 PROCEDURE — 85610 PROTHROMBIN TIME: CPT | Performed by: EMERGENCY MEDICINE

## 2022-02-05 PROCEDURE — 82728 ASSAY OF FERRITIN: CPT | Performed by: STUDENT IN AN ORGANIZED HEALTH CARE EDUCATION/TRAINING PROGRAM

## 2022-02-05 PROCEDURE — 96368 THER/DIAG CONCURRENT INF: CPT

## 2022-02-05 PROCEDURE — 85018 HEMOGLOBIN: CPT | Performed by: STUDENT IN AN ORGANIZED HEALTH CARE EDUCATION/TRAINING PROGRAM

## 2022-02-05 PROCEDURE — 83550 IRON BINDING TEST: CPT | Performed by: STUDENT IN AN ORGANIZED HEALTH CARE EDUCATION/TRAINING PROGRAM

## 2022-02-05 PROCEDURE — NC001 PR NO CHARGE: Performed by: INTERNAL MEDICINE

## 2022-02-05 PROCEDURE — 83540 ASSAY OF IRON: CPT | Performed by: STUDENT IN AN ORGANIZED HEALTH CARE EDUCATION/TRAINING PROGRAM

## 2022-02-05 PROCEDURE — 86900 BLOOD TYPING SEROLOGIC ABO: CPT | Performed by: EMERGENCY MEDICINE

## 2022-02-05 PROCEDURE — 80074 ACUTE HEPATITIS PANEL: CPT | Performed by: STUDENT IN AN ORGANIZED HEALTH CARE EDUCATION/TRAINING PROGRAM

## 2022-02-05 PROCEDURE — 86704 HEP B CORE ANTIBODY TOTAL: CPT | Performed by: STUDENT IN AN ORGANIZED HEALTH CARE EDUCATION/TRAINING PROGRAM

## 2022-02-05 PROCEDURE — 80053 COMPREHEN METABOLIC PANEL: CPT | Performed by: EMERGENCY MEDICINE

## 2022-02-05 PROCEDURE — 85025 COMPLETE CBC W/AUTO DIFF WBC: CPT | Performed by: EMERGENCY MEDICINE

## 2022-02-05 PROCEDURE — 96365 THER/PROPH/DIAG IV INF INIT: CPT

## 2022-02-05 PROCEDURE — 83690 ASSAY OF LIPASE: CPT | Performed by: EMERGENCY MEDICINE

## 2022-02-05 PROCEDURE — 86850 RBC ANTIBODY SCREEN: CPT | Performed by: EMERGENCY MEDICINE

## 2022-02-05 PROCEDURE — 99285 EMERGENCY DEPT VISIT HI MDM: CPT

## 2022-02-05 RX ORDER — SODIUM CHLORIDE, SODIUM GLUCONATE, SODIUM ACETATE, POTASSIUM CHLORIDE, MAGNESIUM CHLORIDE, SODIUM PHOSPHATE, DIBASIC, AND POTASSIUM PHOSPHATE .53; .5; .37; .037; .03; .012; .00082 G/100ML; G/100ML; G/100ML; G/100ML; G/100ML; G/100ML; G/100ML
1000 INJECTION, SOLUTION INTRAVENOUS ONCE
Status: COMPLETED | OUTPATIENT
Start: 2022-02-05 | End: 2022-02-05

## 2022-02-05 RX ORDER — SODIUM CHLORIDE, SODIUM GLUCONATE, SODIUM ACETATE, POTASSIUM CHLORIDE, MAGNESIUM CHLORIDE, SODIUM PHOSPHATE, DIBASIC, AND POTASSIUM PHOSPHATE .53; .5; .37; .037; .03; .012; .00082 G/100ML; G/100ML; G/100ML; G/100ML; G/100ML; G/100ML; G/100ML
125 INJECTION, SOLUTION INTRAVENOUS CONTINUOUS
Status: DISCONTINUED | OUTPATIENT
Start: 2022-02-05 | End: 2022-02-06

## 2022-02-05 RX ORDER — PANTOPRAZOLE SODIUM 40 MG/1
40 INJECTION, POWDER, FOR SOLUTION INTRAVENOUS EVERY 12 HOURS
Status: DISCONTINUED | OUTPATIENT
Start: 2022-02-05 | End: 2022-02-06 | Stop reason: HOSPADM

## 2022-02-05 RX ORDER — ONDANSETRON 2 MG/ML
4 INJECTION INTRAMUSCULAR; INTRAVENOUS EVERY 6 HOURS PRN
Status: DISCONTINUED | OUTPATIENT
Start: 2022-02-05 | End: 2022-02-06 | Stop reason: HOSPADM

## 2022-02-05 RX ORDER — PANTOPRAZOLE SODIUM 40 MG/1
40 INJECTION, POWDER, FOR SOLUTION INTRAVENOUS ONCE
Status: DISCONTINUED | OUTPATIENT
Start: 2022-02-05 | End: 2022-02-05

## 2022-02-05 RX ORDER — POTASSIUM CHLORIDE 20 MEQ/1
40 TABLET, EXTENDED RELEASE ORAL 2 TIMES DAILY
Status: DISCONTINUED | OUTPATIENT
Start: 2022-02-05 | End: 2022-02-05

## 2022-02-05 RX ORDER — POTASSIUM CHLORIDE 14.9 MG/ML
20 INJECTION INTRAVENOUS
Status: COMPLETED | OUTPATIENT
Start: 2022-02-05 | End: 2022-02-06

## 2022-02-05 RX ORDER — ONDANSETRON 2 MG/ML
1 INJECTION INTRAMUSCULAR; INTRAVENOUS ONCE
Status: COMPLETED | OUTPATIENT
Start: 2022-02-05 | End: 2022-02-05

## 2022-02-05 RX ADMIN — SODIUM CHLORIDE, SODIUM GLUCONATE, SODIUM ACETATE, POTASSIUM CHLORIDE, MAGNESIUM CHLORIDE, SODIUM PHOSPHATE, DIBASIC, AND POTASSIUM PHOSPHATE 1000 ML: .53; .5; .37; .037; .03; .012; .00082 INJECTION, SOLUTION INTRAVENOUS at 15:00

## 2022-02-05 RX ADMIN — SODIUM CHLORIDE 1000 ML: 0.9 INJECTION, SOLUTION INTRAVENOUS at 12:22

## 2022-02-05 RX ADMIN — SODIUM CHLORIDE 80 MG: 9 INJECTION, SOLUTION INTRAVENOUS at 15:45

## 2022-02-05 RX ADMIN — POTASSIUM CHLORIDE 20 MEQ: 14.9 INJECTION, SOLUTION INTRAVENOUS at 23:41

## 2022-02-05 RX ADMIN — POTASSIUM CHLORIDE 20 MEQ: 14.9 INJECTION, SOLUTION INTRAVENOUS at 20:01

## 2022-02-05 RX ADMIN — SODIUM CHLORIDE, SODIUM GLUCONATE, SODIUM ACETATE, POTASSIUM CHLORIDE, MAGNESIUM CHLORIDE, SODIUM PHOSPHATE, DIBASIC, AND POTASSIUM PHOSPHATE 125 ML/HR: .53; .5; .37; .037; .03; .012; .00082 INJECTION, SOLUTION INTRAVENOUS at 18:03

## 2022-02-05 RX ADMIN — IOHEXOL 100 ML: 350 INJECTION, SOLUTION INTRAVENOUS at 21:44

## 2022-02-05 NOTE — ASSESSMENT & PLAN NOTE
Associated with loose melanotic stools for the last 2 days  Concern for underlying malignancy as patient reports pencil thin stools for 6 months, without fever/night sweats/weight loss, no prior colonoscopy  Unknown baseline Hb  Associated with mild conjunctival pallor  She is currently hemodynamically stable  Iron panel consistent with anemia of chronic disease with low TIBC and high ferritin      Lab Results   Component Value Date/Time    HGB 8 1 (L) 02/06/2022 03:47 PM    HGB 8 4 (L) 02/06/2022 06:31 AM    HGB 9 3 (L) 02/05/2022 08:07 PM     Plan  - Isolyte 125 cc/hr  - Trend hgb while inpt  - Consider GI follow up outpatient    Outpatient plan:  · See above  · Hemoglobin plateaued and stabilized afternoon of 2/6

## 2022-02-05 NOTE — H&P
INTERNAL MEDICINE RESIDENCY ADMISSION H&P     Name: Rene Gu   Age & Sex: 62 y o  female   MRN: [id number]  Unit/Bed#: ED 27   Encounter: [id number]  Primary Care Provider: Isabel Roberts DO    Code Status: Level 3 - DNAR and DNI  Admission Status: OBSERVATION  Disposition: Patient requires Med/Surg with Telemetry    Admit to team: SOD Team C     ASSESSMENT/PLAN     Principal Problem:    GI bleed  Active Problems:    Elevated liver enzymes    Anemia      * GI bleed  Assessment & Plan  Patient presents with loose melanotic stools for 2 days  She does have some associated abdominal pain  Of note, she states that her stools have been pencil caliber for the last 6 months  No associated fevers, night sweats, weight loss  She does report a one-time history of PUD that was NSAID-induced many years ago  EGD confirmed  Saw GI for this at the time but has not followed up with GI or a PCP in many years as she was asymptomatic  She has never had a colonoscopy  She does not have a family history of colon cancer  Lactate 3 5 - 2 2 with fluid bolus  She is currently hemodynamically stable  Plan  - CT abdomen pelvis  - RUQ US  - CTX prophylaxis   - isolyte 125cc/hr  - PPI  - Clear liquid diet pending GI recs    Elevated liver enzymes  Assessment & Plan  AST 83, ALT 38, T bili 2 18 noted on CMP on admission  INR 1 4  Admits to social alcohol use  Abdomen mildly distended on admission with mildly jaundiced skin  Has never had a colonoscopy  Etiology alcohol abuse vs acute/chronic hepatitis vs malignancy  Plan  - RUQ  - Chronic hepatitis panel  - Acute hepatitis panel  - CTX prophylaxis  - PPI  - isolyte 125 cc/hr    Anemia  Assessment & Plan  Associated with loose melanotic stools for the last 2 days  Concern for underlying malignancy as patient reports pencil thin stools for 6 months, without fever/night sweats/weight loss, no prior colonoscopy  Unknown baseline Hb  Associated with mild conjunctival pallor   She is currently hemodynamically stable  Lab Results   Component Value Date/Time    HGB 11 0 (L) 02/05/2022 12:22 PM     Plan  - Iron studies  - Isolyte 125 cc/hr  - Q12 H/H  - Consider GI follow up outpatient      VTE Pharmacologic Prophylaxis: Reason for no pharmacologic prophylaxis : concern for GI bleed  VTE Mechanical Prophylaxis: sequential compression device    CHIEF COMPLAINT     Chief Complaint   Patient presents with    Diarrhea     Nausea, vomiting, and diarrhea for last 48 hours  States she had bright red blood in her vomit about 7 am this morning  HISTORY OF PRESENT ILLNESS     60-year-old female with no past medical history presents with melanotic loose stools and vomiting for 2 days  She does have some associated abdominal pain that is relieved with bowel movements  This has happened once before many years ago, where she was diagnosed with NSAID induced peptic ulcer disease via EGD  Likely due to time frame, this was not confirmed via Care everywhere  She has not followed up with GI or any PCP since then  She has never had a colonoscopy  She admits to pencil thin stools for about 6 months, but denies fevers/night sweats/weight loss  She denies recent NSAID use, family history of colon cancer, tobacco use, illicit drug use  She admits to social alcohol use  In the ED, patient is hemodynamically stable and afebrile  Labs pertinent for K 3 2, AST 83, ALT 38, T bili 2 18, lactate 3 5 (2 2 after fluid bolus), WBC 10 43, HB 11 (no baseline known), INR 1 4  She was given 1 L isolyte, 1 L NS     REVIEW OF SYSTEMS     Review of Systems   Constitutional: Negative for chills and fever  Respiratory: Negative for cough and shortness of breath  Cardiovascular: Negative for chest pain and palpitations  Gastrointestinal: Positive for abdominal pain, blood in stool and diarrhea  Negative for nausea and vomiting  Genitourinary: Negative for dysuria and hematuria     Musculoskeletal: Negative for arthralgias and back pain  Skin: Negative for color change and rash  Neurological: Negative for dizziness, light-headedness and headaches  All other systems reviewed and are negative  OBJECTIVE     Vitals:    22 1210 22 1300 22 1315 22 1630   BP: 163/72 127/64  129/64   BP Location: Right arm      Pulse: (!) 120 (!) 110 100 94   Resp: 18   18   Temp: 98 5 °F (36 9 °C)      TempSrc: Oral      SpO2: 99% 100% 99% 98%   Weight: 55 3 kg (122 lb)         Temperature:   Temp (24hrs), Av 5 °F (36 9 °C), Min:98 5 °F (36 9 °C), Max:98 5 °F (36 9 °C)    Temperature: 98 5 °F (36 9 °C)  Intake & Output:  I/O       02/ 0701  02/04 0700 02/04 0701  02/05 0700 02/05 0701  02/06 0700    I V  (mL/kg)   1000 (18 1)    IV Piggyback   1100    Total Intake(mL/kg)   2100 (38)    Net   +2100               Weights:        Body mass index is 22 31 kg/m²  Weight (last 2 days)     Date/Time Weight    22 1210 55 3 (122)        Physical Exam  Vitals reviewed  Constitutional:       General: She is not in acute distress  Appearance: Normal appearance  She is well-developed  HENT:      Head: Normocephalic and atraumatic  Eyes:      Comments: Conjunctival pallor   Cardiovascular:      Rate and Rhythm: Normal rate and regular rhythm  Heart sounds: S1 normal and S2 normal  No murmur heard  Pulmonary:      Effort: Pulmonary effort is normal  No respiratory distress  Breath sounds: Normal breath sounds  No decreased breath sounds, wheezing, rhonchi or rales  Abdominal:      General: Bowel sounds are normal  There is no distension  Palpations: Abdomen is soft  Tenderness: There is no abdominal tenderness  Comments: Patient denied rectal exam secondary to pain after loose stools   Musculoskeletal:      Right lower leg: No edema  Left lower leg: No edema  Skin:     General: Skin is warm and dry  Coloration: Skin is jaundiced     Neurological:      Mental Status: She is alert and oriented to person, place, and time  PAST MEDICAL HISTORY     Past Medical History:   Diagnosis Date    Metatarsal fracture     right 2nd and 3rd    Systolic click      PAST SURGICAL HISTORY   No past surgical history on file  SOCIAL & FAMILY HISTORY     Social History     Substance and Sexual Activity   Alcohol Use Not on file       Social History     Substance and Sexual Activity   Drug Use Not on file     Social History     Tobacco Use   Smoking Status Not on file   Smokeless Tobacco Not on file     Family History   Problem Relation Age of Onset    Mitral valve prolapse Mother     Lung cancer Mother     Heart attack Father      LABORATORY DATA     Labs: I have personally reviewed pertinent reports  Results from last 7 days   Lab Units 02/05/22  1222   WBC Thousand/uL 10 43*   HEMOGLOBIN g/dL 11 0*   HEMATOCRIT % 33 8*   PLATELETS Thousands/uL 104*   NEUTROS PCT % 88*   MONOS PCT % 4      Results from last 7 days   Lab Units 02/05/22  1222   POTASSIUM mmol/L 3 2*   CHLORIDE mmol/L 108   CO2 mmol/L 24   BUN mg/dL 22   CREATININE mg/dL 0 60   CALCIUM mg/dL 9 5   ALK PHOS U/L 128*   ALT U/L 38   AST U/L 83*              Results from last 7 days   Lab Units 02/05/22  1331   INR  1 40*   PTT seconds 24     Results from last 7 days   Lab Units 02/05/22  1545   LACTIC ACID mmol/L 2 2*         Micro:  No results found for: Shaneka Rob, WOUNDCULT, SPUTUMCULTUR  IMAGING & DIAGNOSTIC TESTS     Imaging: I have personally reviewed pertinent reports  No results found  EKG, Pathology, and Other Studies: I have personally reviewed pertinent reports       ALLERGIES   No Known Allergies  MEDICATIONS PRIOR TO ARRIVAL     Prior to Admission medications    Not on File     MEDICATIONS ADMINISTERED IN LAST 24 HOURS     Medication Administration - last 24 hours from 02/04/2022 1803 to 02/05/2022 1803       Date/Time Order Dose Route Action Action by     02/05/2022 1515 ondansetron (FOR EMS ONLY) (ZOFRAN) 4 mg/2 mL injection 4 mg 0 mg Does not apply Given to EMS Alan Valderrama RN     02/05/2022 1458 sodium chloride 0 9 % bolus 1,000 mL 0 mL Intravenous Stopped Alan Valderrama RN     02/05/2022 1222 sodium chloride 0 9 % bolus 1,000 mL 1,000 mL Intravenous New Bag Alan Valderrama RN     02/05/2022 1715 multi-electrolyte (ISOLYTE-S PH 7 4) bolus 1,000 mL 0 mL Intravenous Stopped Alan Valderrama RN     02/05/2022 1500 multi-electrolyte (ISOLYTE-S PH 7 4) bolus 1,000 mL 1,000 mL Intravenous [address] Alan Valderrama RN     02/05/2022 1716 pantoprazole (PROTONIX) 80 mg in sodium chloride 0 9 % 100 mL IVPB 0 mg Intravenous Stopped Alan Valderrama RN     02/05/2022 1545 pantoprazole (PROTONIX) 80 mg in sodium chloride 0 9 % 100 mL IVPB 80 mg Intravenous [address] Alan Valderrama RN     02/05/2022 1734 pantoprazole (PROTONIX) injection 40 mg 40 mg Intravenous Not Given Alan Valderrama RN        CURRENT MEDICATIONS     Current Facility-Administered Medications   Medication Dose Route Frequency Provider Last Rate    multi-electrolyte  125 mL/hr Intravenous Continuous Rhonda Maquoketa, DO      ondansetron  4 mg Intravenous Q6H PRN Rhonda Mariah, DO      pantoprazole  40 mg Intravenous Q12H Rhonda Maquoketa, DO      potassium chloride  40 mEq Oral BID Rhonda Mariah, DO       multi-electrolyte, 125 mL/hr      ondansetron, 4 mg, Q6H PRN        Admission Time  I spent 30 minutes admitting the patient  This involved direct patient contact where I performed a full history and physical, reviewing previous records, and reviewing laboratory and other diagnostic studies  Portions of the record may have been created with voice recognition software  Occasional wrong word or "sound a like" substitutions may have occurred due to the inherent limitations of voice recognition software    Read the chart carefully and recognize, using context, where substitutions have occurred     ==  Mehul Minor DO  [address] [place] [address] [facility]  Internal Medicine Residency PGY-1

## 2022-02-05 NOTE — ASSESSMENT & PLAN NOTE
AST 83, ALT 38, T bili 2 18 noted on CMP on admission  INR 1 4  Admits to social alcohol use  Abdomen mildly distended on admission with mildly jaundiced skin  Has never had a colonoscopy  Etiology alcohol abuse vs malignancy   Hepatitis panels negative    Plan  - RUQ US  - PPI  - isolyte 125 cc/hr    Outpatient plan:  · Gastroenterology will continue to follow in the outpatient setting--> follow their recommendations

## 2022-02-05 NOTE — ED ATTENDING ATTESTATION
2/5/2022  I, Stephani Gonzales MD, saw and evaluated the patient  I have discussed the patient with the resident/non-physician practitioner and agree with the resident's/non-physician practitioner's findings, Plan of Care, and MDM as documented in the resident's/non-physician practitioner's note, except where noted  All available labs and Radiology studies were reviewed  I was present for key portions of any procedure(s) performed by the resident/non-physician practitioner and I was immediately available to provide assistance  At this point I agree with the current assessment done in the Emergency Department  I have conducted an independent evaluation of this patient a history and physical is as follows:    Patient presents with a history of diarrhea and vomiting  The patient reports that she has had diarrhea over the last 2-3 days  Patient reports her stools have been dark but not quite black in color  The patient reports several episodes of diarrhea per day  The to these episodes are associated with cramping the preceding the diarrhea but otherwise the patient denies any abdominal pain  Patient reports she had 2 episodes of diarrhea this morning but none since prior to noon  The patient also reports she had 1 episode of vomiting 2 days ago, 2 episodes of vomiting yesterday, and 1 episode of vomiting this morning  The patient reports that the initial episodes of emesis had no blood in it but the last episode, today, had some small specks of bright red blood  The patient denies headache, chest pain, shortness of breath, or fevers  The patient does have a history of peptic ulcer disease  Physical exam demonstrates a pleasant alert nontoxic female no acute distress  HEENT exam was normal   Lungs are clear with equal breath sounds  The heart had a regular rate rhythm  The abdomen is soft and nontender  There is no rebound or guarding  The back was nontender  Skin had no rash    There is no neurologic deficit      ED Course         Critical Care Time  Procedures

## 2022-02-05 NOTE — ED NOTES
Spoke with Anson Gillette in blood bank, ABORh recheck not needed at this time     Riya Choe, RN  02/05/22 8863

## 2022-02-05 NOTE — ASSESSMENT & PLAN NOTE
Patient presents with loose melanotic stools for 2 days  She does have some associated abdominal pain  Of note, she states that her stools have been pencil caliber for the last 6 months  No associated fevers, night sweats, weight loss  She does report a one-time history of PUD that was NSAID-induced many years ago  EGD confirmed  Saw GI for this at the time but has not followed up with GI or a PCP in many years as she was asymptomatic  She has never had a colonoscopy  She does not have a family history of colon cancer  Lactate 3 5 - 2 2 with fluid bolus  She is currently hemodynamically stable      Plan  - CT abdomen pelvis  - RUQ US  - isolyte 125cc/hr  - PPI  - Clear liquid diet pending GI recs    Outpatient plan:  · Gastroenterology will schedule outpatient scope and schedule with the patient in the next 24 hours  · She is unable to tolerate oral PPI pill forms-->she has a problem swallowing them-->not ideal, but will trial Lansoprazole ODT 30mg BID until the scope

## 2022-02-05 NOTE — PROGRESS NOTES
INTERNAL MEDICINE RESIDENCY SENIOR ADMISSION NOTE     Name: Jhon Malik   Age & Sex: 62 y o  female   MRN: [id number]  Unit/Bed#: ED 27   Encounter: [id number]  Primary Care Provider: Kamari Gallegos,     Admit to team: SOD Team C     Patient seen and examined  Reviewed H&P per Dr Kaitlynn San   Agree with the assessment and plan with any exception/addition as noted below:    Patient is a 80-year-old female with past medical history of peptic ulcer disease secondary to increased NSAIDs after wisdom tooth extraction who presents today with bloody diarrhea 2 days  Patient states that stool caliber became skinnier/pencill thin starting 6 months ago  Patient does not take any herbal supplements or medications including aspirin or NSAIDs  Patient has been having abdominal pain, generalized, which is relieved with defecation  Stool is dark brown/maroon colored  She has been going to the bathroom approximately every hour, even throughout the night Patient has not had colonoscopy ever no family history of colon cancer  Patient denies any fever, chills, weight loss, chest pain, shortness of breath, palpitations  While in the ED, patient was tachycardic but normotensive and saturating well on room air  She was  given 2 L bolus  Lipase within normal limits, lactate 3 5, down trended to 2 2 after boluses  INR 1 4 and CBC revealing hemoglobin of 11 with unknown baseline  AST 83, ALT 38 suggestive of cirrhotic/alcoholic pattern  CT abdomen pelvis pending    Of note, patient admits to drinking 3-4 times per week, though she will not quantify how much any given sitting  He denies any tobacco use or intravenous or intranasal drug use    Will admit patient level 3 for suspected GI bleed and anemia  Consult to GI, but Protonix 40 mg b i d , hold any DVT prophylaxis, maintenance fluids, clear liquid diet, hepatitis panel and iron panel with serial q 12 hemoglobins and fractionated bilirubin ordered    Will hold off on ceftriaxone unless cirrhotic pattern seen, then suspicious for SP B/infective colitis  Physical Exam  Constitutional:       Appearance: She is ill-appearing  HENT:      Mouth/Throat:      Mouth: Mucous membranes are moist    Eyes:      General: No scleral icterus  Comments: Conjunctiva pallor   Cardiovascular:      Rate and Rhythm: Regular rhythm  Tachycardia present  Heart sounds: No murmur heard  No gallop  Pulmonary:      Effort: Pulmonary effort is normal       Breath sounds: Normal breath sounds  No wheezing or rales  Abdominal:      General: Bowel sounds are normal  There is distension  Palpations: Abdomen is soft  Tenderness: There is no abdominal tenderness  There is no guarding  Comments: No spider angioma   Genitourinary:     Comments: Patient deferred rectal in setting of pain  Musculoskeletal:      Right lower leg: No edema  Left lower leg: No edema  Skin:     General: Skin is warm  Coloration: Skin is jaundiced (Mild)  Findings: No bruising or erythema  Neurological:      Mental Status: She is alert and oriented to person, place, and time     Psychiatric:         Mood and Affect: Mood normal          Behavior: Behavior normal            Principal Problem:    Elevated liver enzymes  Active Problems:    GI bleed    Anemia      Code Status: Level 1 - Full Code  Admission Status: OBSERVATION  Disposition: Patient requires Med/Surg with Telemetry  Expected Length of Stay: < 2 midnights normal mouth and gums

## 2022-02-06 VITALS
BODY MASS INDEX: 21.58 KG/M2 | HEIGHT: 62 IN | WEIGHT: 117.28 LBS | TEMPERATURE: 98.9 F | RESPIRATION RATE: 18 BRPM | SYSTOLIC BLOOD PRESSURE: 124 MMHG | HEART RATE: 94 BPM | DIASTOLIC BLOOD PRESSURE: 76 MMHG | OXYGEN SATURATION: 97 %

## 2022-02-06 LAB
ALBUMIN SERPL BCP-MCNC: 2.3 G/DL (ref 3.5–5)
ALP SERPL-CCNC: 89 U/L (ref 46–116)
ALT SERPL W P-5'-P-CCNC: 25 U/L (ref 12–78)
ANION GAP SERPL CALCULATED.3IONS-SCNC: 5 MMOL/L (ref 4–13)
AST SERPL W P-5'-P-CCNC: 57 U/L (ref 5–45)
ATRIAL RATE: 112 BPM
BASOPHILS # BLD AUTO: 0.08 THOUSANDS/ΜL (ref 0–0.1)
BASOPHILS NFR BLD AUTO: 1 % (ref 0–1)
BILIRUB DIRECT SERPL-MCNC: 0.54 MG/DL (ref 0–0.2)
BILIRUB SERPL-MCNC: 1.31 MG/DL (ref 0.2–1)
BUN SERPL-MCNC: 20 MG/DL (ref 5–25)
CALCIUM ALBUM COR SERPL-MCNC: 9.7 MG/DL (ref 8.3–10.1)
CALCIUM SERPL-MCNC: 8.3 MG/DL (ref 8.3–10.1)
CHLORIDE SERPL-SCNC: 112 MMOL/L (ref 100–108)
CO2 SERPL-SCNC: 26 MMOL/L (ref 21–32)
CREAT SERPL-MCNC: 0.5 MG/DL (ref 0.6–1.3)
EOSINOPHIL # BLD AUTO: 0.11 THOUSAND/ΜL (ref 0–0.61)
EOSINOPHIL NFR BLD AUTO: 2 % (ref 0–6)
ERYTHROCYTE [DISTWIDTH] IN BLOOD BY AUTOMATED COUNT: 14.3 % (ref 11.6–15.1)
GFR SERPL CREATININE-BSD FRML MDRD: 105 ML/MIN/1.73SQ M
GLUCOSE P FAST SERPL-MCNC: 87 MG/DL (ref 65–99)
GLUCOSE SERPL-MCNC: 87 MG/DL (ref 65–140)
HCT VFR BLD AUTO: 25.9 % (ref 34.8–46.1)
HCT VFR BLD AUTO: 26.3 % (ref 34.8–46.1)
HGB BLD-MCNC: 8.1 G/DL (ref 11.5–15.4)
HGB BLD-MCNC: 8.4 G/DL (ref 11.5–15.4)
IMM GRANULOCYTES # BLD AUTO: 0.05 THOUSAND/UL (ref 0–0.2)
IMM GRANULOCYTES NFR BLD AUTO: 1 % (ref 0–2)
INR PPP: 1.33 (ref 0.84–1.19)
LYMPHOCYTES # BLD AUTO: 0.92 THOUSANDS/ΜL (ref 0.6–4.47)
LYMPHOCYTES NFR BLD AUTO: 13 % (ref 14–44)
MCH RBC QN AUTO: 32.8 PG (ref 26.8–34.3)
MCHC RBC AUTO-ENTMCNC: 31.9 G/DL (ref 31.4–37.4)
MCV RBC AUTO: 103 FL (ref 82–98)
MONOCYTES # BLD AUTO: 0.57 THOUSAND/ΜL (ref 0.17–1.22)
MONOCYTES NFR BLD AUTO: 8 % (ref 4–12)
NEUTROPHILS # BLD AUTO: 5.34 THOUSANDS/ΜL (ref 1.85–7.62)
NEUTS SEG NFR BLD AUTO: 75 % (ref 43–75)
NRBC BLD AUTO-RTO: 0 /100 WBCS
P AXIS: 66 DEGREES
PMV BLD AUTO: 11.1 FL (ref 8.9–12.7)
POTASSIUM SERPL-SCNC: 3.6 MMOL/L (ref 3.5–5.3)
PR INTERVAL: 170 MS
PROT SERPL-MCNC: 5.8 G/DL (ref 6.4–8.2)
PROTHROMBIN TIME: 15.9 SECONDS (ref 11.6–14.5)
QRS AXIS: 59 DEGREES
QRSD INTERVAL: 60 MS
QT INTERVAL: 298 MS
QTC INTERVAL: 406 MS
RBC # BLD AUTO: 2.56 MILLION/UL (ref 3.81–5.12)
SODIUM SERPL-SCNC: 143 MMOL/L (ref 136–145)
T WAVE AXIS: 66 DEGREES
VENTRICULAR RATE: 112 BPM
WBC # BLD AUTO: 7.07 THOUSAND/UL (ref 4.31–10.16)

## 2022-02-06 PROCEDURE — 85025 COMPLETE CBC W/AUTO DIFF WBC: CPT | Performed by: STUDENT IN AN ORGANIZED HEALTH CARE EDUCATION/TRAINING PROGRAM

## 2022-02-06 PROCEDURE — 82248 BILIRUBIN DIRECT: CPT | Performed by: STUDENT IN AN ORGANIZED HEALTH CARE EDUCATION/TRAINING PROGRAM

## 2022-02-06 PROCEDURE — NC001 PR NO CHARGE: Performed by: INTERNAL MEDICINE

## 2022-02-06 PROCEDURE — 80053 COMPREHEN METABOLIC PANEL: CPT | Performed by: STUDENT IN AN ORGANIZED HEALTH CARE EDUCATION/TRAINING PROGRAM

## 2022-02-06 PROCEDURE — 85018 HEMOGLOBIN: CPT | Performed by: STUDENT IN AN ORGANIZED HEALTH CARE EDUCATION/TRAINING PROGRAM

## 2022-02-06 PROCEDURE — C9113 INJ PANTOPRAZOLE SODIUM, VIA: HCPCS | Performed by: STUDENT IN AN ORGANIZED HEALTH CARE EDUCATION/TRAINING PROGRAM

## 2022-02-06 PROCEDURE — 99244 OFF/OP CNSLTJ NEW/EST MOD 40: CPT | Performed by: INTERNAL MEDICINE

## 2022-02-06 PROCEDURE — 85610 PROTHROMBIN TIME: CPT | Performed by: STUDENT IN AN ORGANIZED HEALTH CARE EDUCATION/TRAINING PROGRAM

## 2022-02-06 PROCEDURE — 99219 PR INITIAL OBSERVATION CARE/DAY 50 MINUTES: CPT | Performed by: INTERNAL MEDICINE

## 2022-02-06 PROCEDURE — 93010 ELECTROCARDIOGRAM REPORT: CPT | Performed by: INTERNAL MEDICINE

## 2022-02-06 PROCEDURE — 85014 HEMATOCRIT: CPT | Performed by: STUDENT IN AN ORGANIZED HEALTH CARE EDUCATION/TRAINING PROGRAM

## 2022-02-06 RX ORDER — ONDANSETRON 4 MG/1
4 TABLET, ORALLY DISINTEGRATING ORAL EVERY 6 HOURS PRN
Qty: 20 TABLET | Refills: 0 | Status: SHIPPED | OUTPATIENT
Start: 2022-02-06

## 2022-02-06 RX ORDER — LANSOPRAZOLE 30 MG/1
TABLET, ORALLY DISINTEGRATING, DELAYED RELEASE ORAL
Qty: 90 TABLET | Refills: 0 | Status: SHIPPED | OUTPATIENT
Start: 2022-02-06

## 2022-02-06 RX ORDER — PANTOPRAZOLE SODIUM 40 MG/1
TABLET, DELAYED RELEASE ORAL
Qty: 90 TABLET | Refills: 0 | Status: SHIPPED | OUTPATIENT
Start: 2022-02-06 | End: 2022-02-06 | Stop reason: HOSPADM

## 2022-02-06 RX ORDER — POTASSIUM CHLORIDE 20 MEQ/1
40 TABLET, EXTENDED RELEASE ORAL ONCE
Status: DISCONTINUED | OUTPATIENT
Start: 2022-02-06 | End: 2022-02-06

## 2022-02-06 RX ORDER — POTASSIUM CHLORIDE AND SODIUM CHLORIDE 900; 300 MG/100ML; MG/100ML
100 INJECTION, SOLUTION INTRAVENOUS CONTINUOUS
Status: DISCONTINUED | OUTPATIENT
Start: 2022-02-06 | End: 2022-02-06 | Stop reason: HOSPADM

## 2022-02-06 RX ADMIN — PANTOPRAZOLE SODIUM 40 MG: 40 INJECTION, POWDER, FOR SOLUTION INTRAVENOUS at 06:15

## 2022-02-06 RX ADMIN — POTASSIUM CHLORIDE AND SODIUM CHLORIDE 100 ML/HR: 900; 300 INJECTION, SOLUTION INTRAVENOUS at 11:34

## 2022-02-06 RX ADMIN — PANTOPRAZOLE SODIUM 40 MG: 40 INJECTION, POWDER, FOR SOLUTION INTRAVENOUS at 16:43

## 2022-02-06 NOTE — UTILIZATION REVIEW
Initial Clinical Review    Admission: Date/Time/Statement:   Admission Orders (From admission, onward)     Ordered        02/05/22 1657  Place in Observation  Once                      Orders Placed This Encounter   Procedures    Place in Observation     Standing Status:   Standing     Number of Occurrences:   1     Order Specific Question:   Level of Care     Answer:   Med Surg [16]     ED Arrival Information     Expected Arrival Acuity    - 2/5/2022 12:08 Urgent         Means of arrival Escorted by Service Admission type    [facility] [address] EMS SOD-C Medicine Urgent         Arrival complaint    diarrhea        Chief Complaint   Patient presents with    Diarrhea     Nausea, vomiting, and diarrhea for last 48 hours  States she had bright red blood in her vomit about 7 am this morning  Initial Presentation:     62year old female presents to ed from home for evaluation and treatment of nausea, vomiting and diarrhea for 48 hours and hematemesis  Patient reports dark stool  Patient reports she had 2 episodes of diarrhea this morning but none since prior to noon  The patient also reports she had 1 episode of vomiting 2 days ago, 2 episodes of vomiting yesterday, and 1 episode of vomiting this morning  Received iv zofran x1 prior to arrival   Clinical assessment significant for tachycardia  Lactic acid 3 5,potassium 3 2, t bili 2 18, wbc 10 43  Treated initially with iv  9% ns, iv multi-electrolyte, iv protonix  Admit to observation for GI bleed  Date: 2-6-22    Day 2: med surg observation  Continue to treat gi bleed, anemia and elevated liver enzymes with  iv fluids with kcl and  iv protonix  Obtain CT abdomen and pelvis and US of RUQ  Start clear liquid diet   Consult gastroenteroology        ED Triage Vitals [02/05/22 1210]   98 5 °F (36 9 °C) (!) 120 18 163/72 99 %      Oral Monitor         No Pain          02/05/22 53 2 kg (117 lb 4 6 oz)     Additional Vital Signs:    Date/Time Temp Pulse Resp BP MAP SpO2 O2 Device   02/06/22 08:00:09 98 4 °F (36 9 °C) 85 18 125/76 92 99 % --   02/05/22 22:32:29 98 8 °F (37 1 °C) 99 16 128/75 93 97 % --   02/05/22 1955 -- -- -- -- -- -- None (Room air)   02/05/22 18:56:12 98 1 °F (36 7 °C) 99 20 126/73 91 97 % None (Room air)   02/05/22 1630 -- 94 18 129/64 91 98 % --   02/05/22 1315 -- 100 -- -- -- 99 % --   02/05/22 1300 -- 110   Abnormal  -- 127/64 89 100 % --               Pertinent Labs/Diagnostic Test Results:     Collection Time Result Time Vent R Atrial R MS Int  QRSD Int  QT Int  QTC Int   P Axis QRS Axis T Wave Ax    02/05/22 12:34:48 02/06/22 10:21:45 112 112 170 60 298 406 66 59 66                       Sinus tachycardia with occasional Premature ventricular complexes   T wave abnormality, consider inferior ischemia   Abnormal ECG   No previous ECGs available                 Results from last 7 days   Lab Units 02/06/22 0631 02/05/22 2007 02/05/22  1222   WBC Thousand/uL 7 07  --  10 43*   HEMOGLOBIN g/dL 8 4* 9 3* 11 0*   HEMATOCRIT % 26 3*  --  33 8*   PLATELETS Thousands/uL  --   --  104*   NEUTROS ABS Thousands/µL 5 34  --  9 28*         Results from last 7 days   Lab Units 02/06/22 0631 02/05/22  1222   SODIUM mmol/L 143 142   POTASSIUM mmol/L 3 6 3 2*   CHLORIDE mmol/L 112* 108   CO2 mmol/L 26 24   ANION GAP mmol/L 5 10   BUN mg/dL 20 22   CREATININE mg/dL 0 50* 0 60   EGFR ml/min/1 73sq m 105 99   CALCIUM mg/dL 8 3 9 5     Results from last 7 days   Lab Units 02/06/22 0631 02/05/22  1222   AST U/L 57* 83*   ALT U/L 25 38   ALK PHOS U/L 89 128*   TOTAL PROTEIN g/dL 5 8* 7 5   ALBUMIN g/dL 2 3* 2 8*   TOTAL BILIRUBIN mg/dL 1 31* 2 18*   BILIRUBIN DIRECT mg/dL 0 54*  --          Results from last 7 days   Lab Units 02/06/22  0631 02/05/22  1222   GLUCOSE RANDOM mg/dL 87 130       Results from last 7 days   Lab Units 02/06/22  0631 02/05/22  1331   PROTIME seconds 15 9* 16 6*   INR  1 33* 1 40*   PTT seconds  --  24             Results from last 7 days   Lab Units 02/05/22 2007 02/05/22  1545 02/05/22  1331   LACTIC ACID mmol/L 1 7 2 2* 3 5*       Results from last 7 days   Lab Units 02/05/22  1802   FERRITIN ng/mL 620*     Results from last 7 days   Lab Units 02/05/22  1802   HEP B S AG  Non-reactive  Non-reactive   HEP C AB  Non-reactive  Non-reactive   HEP B C IGM  Non-reactive  Non-reactive   HEP B C TOTAL AB  Non-reactive     Results from last 7 days   Lab Units 02/05/22  1222   LIPASE u/L 61*       ED Treatment:   Medication Administration from 02/05/2022 1208 to 02/05/2022 1857       Date/Time Order Dose Route Action     02/05/2022 1222 sodium chloride 0 9 % bolus 1,000 mL 1,000 mL Intravenous New Bag     02/05/2022 1500 multi-electrolyte (ISOLYTE-S PH 7 4) bolus 1,000 mL 1,000 mL Intravenous New Bag     02/05/2022 1545 pantoprazole (PROTONIX) 80 mg in sodium chloride 0 9 % 100 mL IVPB 80 mg Intravenous New Bag     02/05/2022 1803 multi-electrolyte (PLASMALYTE-A/ISOLYTE-S PH 7 4) IV solution 125 mL/hr Intravenous New Bag        Past Medical History:   Diagnosis    Metatarsal fracture    right 2nd and 3rd    Systolic click     Present on Admission:   Anemia   Elevated liver enzymes   GI bleed      Admitting Diagnosis:     Diarrhea [R19 7]  GI bleed [K92 2]    Age/Sex: 62 y o  female      Scheduled Medications:  pantoprazole, 40 mg, Intravenous, Q12H      Continuous IV Infusions:  sodium chloride 0 9 % with KCl 40 mEq/L, 100 mL/hr, Intravenous, Continuous      PRN Meds:  ondansetron, 4 mg, Intravenous, Q6H PRN        IP CONSULT TO GASTROENTEROLOGY    Network Utilization Review Department  ATTENTION: Please call with any questions or concerns to [phone] and carefully listen to the prompts so that you are directed to the right person   All voicemails are confidential   Dayana Phelps all requests for admission clinical reviews, approved or denied determinations and any other requests to dedicated fax number below belonging to the campus where the patient is receiving treatment   List of dedicated fax numbers for the Facilities:  [address] [place] [address] DENIALS (Administrative/Medical Necessity) [phone]   [address] (Maternity/NICU/Pediatrics) [phone]   [address] [place] [address]  [address] [facility] [place] [address] [id number] [place] [address] [phone] [place] [address] [phone]

## 2022-02-06 NOTE — DISCHARGE SUMMARY
INTERNAL MEDICINE RESIDENCY DISCHARGE SUMMARY     Heather Baumann   62 y o  female  MRN: [id number]  Room/Bed: [facility] 91/[facility] [address] [place] [address]   Encounter: [id number]    Principal Problem:    GI bleed  Active Problems:    Anemia    Elevated liver enzymes      * GI bleed  Assessment & Plan  Patient presents with loose melanotic stools for 2 days  She does have some associated abdominal pain  Of note, she states that her stools have been pencil caliber for the last 6 months  No associated fevers, night sweats, weight loss  She does report a one-time history of PUD that was NSAID-induced many years ago  EGD confirmed  Saw GI for this at the time but has not followed up with GI or a PCP in many years as she was asymptomatic  She has never had a colonoscopy  She does not have a family history of colon cancer  Lactate 3 5 - 2 2 with fluid bolus  She is currently hemodynamically stable  Plan  - CT abdomen pelvis  - RUQ US  - isolyte 125cc/hr  - PPI  - Clear liquid diet pending GI recs    Outpatient plan:  · Gastroenterology will schedule outpatient scope and schedule with the patient in the next 24 hours  · She is unable to tolerate oral PPI pill forms-->she has a problem swallowing them-->not ideal, but will trial Lansoprazole ODT 30mg BID until the scope    Elevated liver enzymes  Assessment & Plan  AST 83, ALT 38, T bili 2 18 noted on CMP on admission  INR 1 4  Admits to social alcohol use  Abdomen mildly distended on admission with mildly jaundiced skin  Has never had a colonoscopy  Etiology alcohol abuse vs malignancy  Hepatitis panels negative    Plan  - RUQ US  - PPI  - isolyte 125 cc/hr    Outpatient plan:  · Gastroenterology will continue to follow in the outpatient setting--> follow their recommendations    Anemia  Assessment & Plan  Associated with loose melanotic stools for the last 2 days   Concern for underlying malignancy as patient reports pencil thin stools for 6 months, without fever/night sweats/weight loss, no prior colonoscopy  Unknown baseline Hb  Associated with mild conjunctival pallor  She is currently hemodynamically stable  Iron panel consistent with anemia of chronic disease with low TIBC and high ferritin  Lab Results   Component Value Date/Time    HGB 8 1 (L) 02/06/2022 03:47 PM    HGB 8 4 (L) 02/06/2022 06:31 AM    HGB 9 3 (L) 02/05/2022 08:07 PM     Plan  - Isolyte 125 cc/hr  - Trend hgb while inpt  - Consider GI follow up outpatient    Outpatient plan:  · See above  · Hemoglobin plateaued and stabilized afternoon of 2/6      [address]     Patient is a 11year-old female with the PMH of PUD 2/2 NSAIDs presented on 02/05/2022 for hematochezia and bloody diarrhea  Before presentation to the hospital, she had been going to bathroom every hour and throughout the night  Upon presentation, lab work was significant for lactic acidosis and anemia thought to be to blood loss, elevated liver enzymes  The next day on 02/06/2022 she was evaluated at bedside  She was feeling better and denied any more abdominal pain  Her last bowel movement was the evening before  Throughout her course of admission, she received IV fluid hydration and IV PPI with plans for scoping by GI  She insisted to both the primary and GI teams that she wanted to go home and take care of her pets, preferring to do the scopes in the outpatient setting  After monitoring her hemoglobin throughout the day (which stabilized and plateaued), GI team was okay with discharging the patient as long as she had close follow-up  They are planning on setting up an appointment for her and will reach out to her tomorrow morning  She was explained the risks of leaving the hospital and the importance of follow-up, which she understood      DISCHARGE INFORMATION     PCP at Discharge: Viv Talbot DO    Admitting Provider: Verito Clayton DO  Admission Date: 2/5/2022    Discharge Provider: Satnam Pemberton DO  Discharge Date:  02/06/2022    Discharge Disposition: Home/Self Care  Discharge Condition: good  Discharge with Lines: no    Discharge Diet: clear liquids  Activity Restrictions: none  Test Results Pending at Discharge: [address], CT A/P with contrast    Discharge Diagnoses:  Principal Problem:    GI bleed  Active Problems:    Anemia    Elevated liver enzymes  Resolved Problems:    * No resolved hospital problems  *      Consulting Providers:  Gastroenterology    Diagnostic & Therapeutic Procedures Performed:  No results found  Code Status: Level 3 - DNAR and DNI  Advance Directive & Living Will: <no information>  Power of :    POLST:      Medications:  Current Discharge Medication List      START taking these medications    Details   lansoprazole (PREVACID SOLUTAB) 30 mg disintegrating tablet Take 1 tablet twice daily 30-60 minutes before meals until GI scope procedure  Then can take as directed by GI doctors  Qty: 90 tablet, Refills: 0    Associated Diagnoses: GI bleed      ondansetron (Zofran ODT) 4 mg disintegrating tablet Take 1 tablet (4 mg total) by mouth every 6 (six) hours as needed for nausea or vomiting  Qty: 20 tablet, Refills: 0    Associated Diagnoses: Nausea & vomiting           Current Discharge Medication List        Current Discharge Medication List        Current Discharge Medication List            Allergies:  No Known Allergies    FOLLOW-UP     PCP Outpatient Follow-up:  Yes--within 1-2 weeks    Consulting Providers Follow-up:  yes  with Gastroenterology, who will perform EGD/Colonoscopy in the next few days in the outpatient setting  Active Issues Requiring Follow-up:   Yes--GI bleed, elevated liver enzymes    Discharge Statement:   I spent 45 minutes minutes discharging the patient  This time was spent on the day of discharge  I had direct contact with the patient on the day of discharge   Additional documentation is required if more than 30 minutes were spent on discharge  Portions of the record may have been created with voice recognition software  Occasional wrong word or "sound a like" substitutions may have occurred due to the inherent limitations of voice recognition software    Read the chart carefully and recognize, using context, where substitutions have occurred     ==  Edna Bojorquez, DO  [address]  Internal Medicine Resident PGY-2

## 2022-02-06 NOTE — ED PROVIDER NOTES
History  Chief Complaint   Patient presents with    Diarrhea     Nausea, vomiting, and diarrhea for last 48 hours  States she had bright red blood in her vomit about 7 am this morning  62year old female with history of peptic ulcer disease presenting for evaluation of severe diarrhea and multiple episodes of vomiting over the past 2-3 days  This morning her 3rd episode of vomiting had a small amount of blood in it  She also notes that her stools have been extremely dark in color  Has been extremely dehydrated, feeling weak all over and lightheaded at times  Has not had a history of GI bleeding in the past as far she is aware  No alcohol use  No aspirin or other NSAID use  No known sick contacts  Patient states approximately 2 weeks ago she started eating a significant amount of cabbage soup and is not sure if that is related  No fevers/chills  History provided by:  Patient   used: No    Diarrhea  Associated symptoms: vomiting    Associated symptoms: no abdominal pain, no arthralgias, no chills, no fever and no headaches        None       Past Medical History:   Diagnosis Date    Metatarsal fracture     right 2nd and 3rd    Systolic click        No past surgical history on file  Family History   Problem Relation Age of Onset    Mitral valve prolapse Mother     Lung cancer Mother     Heart attack Father      I have reviewed and agree with the history as documented  E-Cigarette/Vaping     E-Cigarette/Vaping Substances     Social History     Tobacco Use    Smoking status: Never Smoker    Smokeless tobacco: Never Used   Substance Use Topics    Alcohol use: Not on file    Drug use: Not on file        Review of Systems   Constitutional: Negative for chills and fever  HENT: Negative for ear pain and sore throat  Eyes: Negative for visual disturbance  Respiratory: Negative for cough and shortness of breath      Cardiovascular: Negative for chest pain and palpitations  Gastrointestinal: Positive for diarrhea, nausea and vomiting  Negative for abdominal pain  Genitourinary: Negative for dysuria and hematuria  Musculoskeletal: Negative for arthralgias and back pain  Skin: Negative for color change and rash  Neurological: Negative for syncope, light-headedness and headaches  Psychiatric/Behavioral: Negative for confusion  The patient is not nervous/anxious  All other systems reviewed and are negative  Physical Exam  ED Triage Vitals [02/05/22 1210]   Temperature Pulse Respirations Blood Pressure SpO2   98 5 °F (36 9 °C) (!) 120 18 163/72 99 %      Temp Source Heart Rate Source Patient Position - Orthostatic VS BP Location FiO2 (%)   Oral Monitor Sitting Right arm --      Pain Score       No Pain             Orthostatic Vital Signs  Vitals:    02/05/22 1856 02/05/22 2232 02/06/22 0800 02/06/22 1532   BP: 126/73 128/75 125/76 124/76   Pulse: 99 99 85 94   Patient Position - Orthostatic VS: Lying          Physical Exam  Vitals and nursing note reviewed  Constitutional:       General: She is not in acute distress  Appearance: She is well-developed  She is not ill-appearing or diaphoretic  HENT:      Head: Normocephalic and atraumatic  Right Ear: External ear normal       Left Ear: External ear normal       Nose: Nose normal       Mouth/Throat:      Mouth: Mucous membranes are dry  Comments: Extremely dry MM  Eyes:      Conjunctiva/sclera: Conjunctivae normal    Cardiovascular:      Rate and Rhythm: Normal rate and regular rhythm  Heart sounds: No murmur heard  Pulmonary:      Effort: Pulmonary effort is normal  No respiratory distress  Breath sounds: Normal breath sounds  Abdominal:      Palpations: Abdomen is soft  Tenderness: There is no abdominal tenderness  Musculoskeletal:         General: Normal range of motion  Cervical back: Normal range of motion and neck supple  Right lower leg: No edema  Left lower leg: No edema  Skin:     General: Skin is warm and dry  Neurological:      General: No focal deficit present  Mental Status: She is alert        Gait: Gait normal    Psychiatric:         Mood and Affect: Mood normal          ED Medications  Medications   ondansetron (ZOFRAN) injection 4 mg (has no administration in time range)   pantoprazole (PROTONIX) injection 40 mg (40 mg Intravenous Given 2/6/22 1643)   sodium chloride 0 9 % with KCl 40 mEq/L infusion (premix) (100 mL/hr Intravenous New Bag 2/6/22 1134)   ondansetron (FOR EMS ONLY) (ZOFRAN) 4 mg/2 mL injection 4 mg (0 mg Does not apply Given to EMS 2/5/22 1515)   sodium chloride 0 9 % bolus 1,000 mL (0 mL Intravenous Stopped 2/5/22 1458)   multi-electrolyte (ISOLYTE-S PH 7 4) bolus 1,000 mL (0 mL Intravenous Stopped 2/5/22 1715)   pantoprazole (PROTONIX) 80 mg in sodium chloride 0 9 % 100 mL IVPB (0 mg Intravenous Stopped 2/5/22 1716)   potassium chloride 20 mEq IVPB (premix) (0 mEq Intravenous Stopped 2/6/22 0300)   iohexol (OMNIPAQUE) 350 MG/ML injection (SINGLE-DOSE) 100 mL (100 mL Intravenous Given 2/5/22 2144)       Diagnostic Studies  Results Reviewed     Procedure Component Value Units Date/Time    Protime-INR [[id number]]  (Abnormal) Collected: 02/06/22 0631    Lab Status: Final result Specimen: Blood from Arm, Right Updated: 02/06/22 0940     Protime 15 9 seconds      INR 1 33    CBC and differential [[id number]]  (Abnormal) Collected: 02/06/22 0631    Lab Status: Final result Specimen: Blood from Arm, Right Updated: 02/06/22 0936     WBC 7 07 Thousand/uL      RBC 2 56 Million/uL      Hemoglobin 8 4 g/dL      Hematocrit 26 3 %       fL      MCH 32 8 pg      MCHC 31 9 g/dL      RDW 14 3 %      MPV 11 1 fL      Platelets --     nRBC 0 /100 WBCs      Neutrophils Relative 75 %      Immat GRANS % 1 %      Lymphocytes Relative 13 %      Monocytes Relative 8 %      Eosinophils Relative 2 %      Basophils Relative 1 %      Neutrophils Absolute 5 34 Thousands/µL      Immature Grans Absolute 0 05 Thousand/uL      Lymphocytes Absolute 0 92 Thousands/µL      Monocytes Absolute 0 57 Thousand/µL      Eosinophils Absolute 0 11 Thousand/µL      Basophils Absolute 0 08 Thousands/µL     Comprehensive metabolic panel [[id number]]  (Abnormal) Collected: 02/06/22 0631    Lab Status: Final result Specimen: Blood from Arm, Right Updated: 02/06/22 0752     Sodium 143 mmol/L      Potassium 3 6 mmol/L      Chloride 112 mmol/L      CO2 26 mmol/L      ANION GAP 5 mmol/L      BUN 20 mg/dL      Creatinine 0 50 mg/dL      Glucose 87 mg/dL      Glucose, Fasting 87 mg/dL      Calcium 8 3 mg/dL      Corrected Calcium 9 7 mg/dL      AST 57 U/L      ALT 25 U/L      Alkaline Phosphatase 89 U/L      Total Protein 5 8 g/dL      Albumin 2 3 g/dL      Total Bilirubin 1 31 mg/dL      eGFR 105 ml/min/1 73sq m     Narrative:      [place] guidelines for Chronic Kidney Disease (CKD):     Stage 1 with normal or high GFR (GFR > 90 mL/min/1 73 square meters)    Stage 2 Mild CKD (GFR = 60-89 mL/min/1 73 square meters)    Stage 3A Moderate CKD (GFR = 45-59 mL/min/1 73 square meters)    Stage 3B Moderate CKD (GFR = 30-44 mL/min/1 73 square meters)    Stage 4 Severe CKD (GFR = 15-29 mL/min/1 73 square meters)    Stage 5 End Stage CKD (GFR <15 mL/min/1 73 square meters)  Note: GFR calculation is accurate only with a steady state creatinine    Bilirubin, direct [[id number]]  (Abnormal) Collected: 02/06/22 0631    Lab Status: Final result Specimen: Blood from Arm, Right Updated: 02/06/22 0731     Bilirubin, Direct 0 54 mg/dL     Lactic acid, plasma [[id number]]  (Normal) Collected: 02/05/22 2007    Lab Status: Final result Specimen: Blood from Arm, Left Updated: 02/05/22 2043     LACTIC ACID 1 7 mmol/L     Narrative:      Result may be elevated if tourniquet was used during collection      Serial Hemoglobin, Q12hrs [[id number]]  (Abnormal) Collected: 02/05/22 2007 Lab Status: Final result Specimen: Blood from Arm, Left Updated: 02/05/22 2022     Hemoglobin 9 3 g/dL     Hepatitis panel, acute [[id number]]  (Normal) Collected: 02/05/22 1802    Lab Status: Final result Specimen: Blood from Arm, Left Updated: 02/05/22 1924     Hepatitis B Surface Ag Non-reactive     Hep A IgM Non-reactive     Hepatitis C Ab Non-reactive     Hep B C IgM Non-reactive    Chronic Hepatitis Panel [[id number]]  (Normal) Collected: 02/05/22 1802    Lab Status: Final result Specimen: Blood from Arm, Left Updated: 02/05/22 1924     Hepatitis B Surface Ag Non-reactive     Hepatitis C Ab Non-reactive     Hep B C IgM Non-reactive     Hep B Core Total Ab Non-reactive    Iron Saturation % [[id number]]  (Abnormal) Collected: 02/05/22 1802    Lab Status: Final result Specimen: Blood from Arm, Left Updated: 02/05/22 1833     Iron Saturation 93 %      TIBC 163 ug/dL      Iron 151 ug/dL     Ferritin [[id number]]  (Abnormal) Collected: 02/05/22 1802    Lab Status: Final result Specimen: Blood from Arm, Left Updated: 02/05/22 1833     Ferritin 620 ng/mL     Lactic acid 2 Hours [[id number]]  (Abnormal) Collected: 02/05/22 1545    Lab Status: Final result Specimen: Blood from Arm, Left Updated: 02/05/22 1716     LACTIC ACID 2 2 mmol/L     Narrative:      Result may be elevated if tourniquet was used during collection  Lactic acid, plasma [[id number]]  (Abnormal) Collected: 02/05/22 1331    Lab Status: Final result Specimen: Blood from Arm, Left Updated: 02/05/22 1443     LACTIC ACID 3 5 mmol/L     Narrative:      Result may be elevated if tourniquet was used during collection      APTT [[id number]]  (Normal) Collected: 02/05/22 1331    Lab Status: Final result Specimen: Blood from Arm, Left Updated: 02/05/22 1443     PTT 24 seconds     Protime-INR [[id number]]  (Abnormal) Collected: 02/05/22 1331    Lab Status: Final result Specimen: Blood from Arm, Left Updated: 02/05/22 1443     Protime 16 6 seconds      INR 1 40 Comprehensive metabolic panel [[id number]]  (Abnormal) Collected: 02/05/22 1222    Lab Status: Final result Specimen: Blood from Arm, Left Updated: 02/05/22 1301     Sodium 142 mmol/L      Potassium 3 2 mmol/L      Chloride 108 mmol/L      CO2 24 mmol/L      ANION GAP 10 mmol/L      BUN 22 mg/dL      Creatinine 0 60 mg/dL      Glucose 130 mg/dL      Calcium 9 5 mg/dL      Corrected Calcium 10 5 mg/dL      AST 83 U/L      ALT 38 U/L      Alkaline Phosphatase 128 U/L      Total Protein 7 5 g/dL      Albumin 2 8 g/dL      Total Bilirubin 2 18 mg/dL      eGFR 99 ml/min/1 73sq m     Narrative:      [employer] guidelines for Chronic Kidney Disease (CKD):     Stage 1 with normal or high GFR (GFR > 90 mL/min/1 73 square meters)    Stage 2 Mild CKD (GFR = 60-89 mL/min/1 73 square meters)    Stage 3A Moderate CKD (GFR = 45-59 mL/min/1 73 square meters)    Stage 3B Moderate CKD (GFR = 30-44 mL/min/1 73 square meters)    Stage 4 Severe CKD (GFR = 15-29 mL/min/1 73 square meters)    Stage 5 End Stage CKD (GFR <15 mL/min/1 73 square meters)  Note: GFR calculation is accurate only with a steady state creatinine    Lipase [[id number]]  (Abnormal) Collected: 02/05/22 1222    Lab Status: Final result Specimen: Blood from Arm, Left Updated: 02/05/22 1259     Lipase 61 u/L     CBC and differential [[id number]]  (Abnormal) Collected: 02/05/22 1222    Lab Status: Final result Specimen: Blood from Arm, Left Updated: 02/05/22 1233     WBC 10 43 Thousand/uL      RBC 3 39 Million/uL      Hemoglobin 11 0 g/dL      Hematocrit 33 8 %       fL      MCH 32 4 pg      MCHC 32 5 g/dL      RDW 13 9 %      MPV 10 9 fL      Platelets 995 Thousands/uL      nRBC 0 /100 WBCs      Neutrophils Relative 88 %      Immat GRANS % 1 %      Lymphocytes Relative 6 %      Monocytes Relative 4 %      Eosinophils Relative 0 %      Basophils Relative 1 %      Neutrophils Absolute 9 28 Thousands/µL      Immature Grans Absolute 0 07 Thousand/uL      Lymphocytes Absolute 0 60 Thousands/µL      Monocytes Absolute 0 43 Thousand/µL      Eosinophils Absolute 0 00 Thousand/µL      Basophils Absolute 0 05 Thousands/µL                  US right upper quadrant    (Results Pending)   CT abdomen pelvis w contrast    (Results Pending)         Procedures  Procedures      ED Course  ED Course as of 02/06/22 1709   Sat Feb 05, 2022   1244 Heme positive melenotic stool    1447 LACTIC ACID(!!): 3 5   1447 POCT INR(!): 1 40                                       MDM  Number of Diagnoses or Management Options  Black stools  Diarrhea  Diagnosis management comments: 61 yo F presenting for severe diarrhea and a few episodes of vomiting, black heme + stool on exam today  Hgb 11 from unknown baseline  Will give protonix for possible UGIB  Lactic also elevated at 3 5, consistent with severe volume depletion evident on exam today  Admitted to medicine for further volume repletion and possible GI evaluation  Disposition  Final diagnoses:   Diarrhea   Black stools     Time reflects when diagnosis was documented in both MDM as applicable and the Disposition within this note     Time User Action Codes Description Comment    2/5/2022  5:04 PM Marcus Lizarraga Add [K92 2] GI bleed     2/6/2022  4:29 PM Simeon Mathews Add [R11 2] Nausea & vomiting     2/6/2022  5:05 PM Lacey, Alisha Mustard Add [R19 7] Diarrhea     2/6/2022  5:05 PM Lacey, Alisha Mustard Add [K92 1] Black stools     2/6/2022  5:05 PM Lacey, Alisha Mustard Modify [K92 2] GI bleed     2/6/2022  5:05 PM Lacey, Alisha Mustard Modify [R19 7] Diarrhea       ED Disposition     ED Disposition Condition Date/Time Comment    Admit Stable Sun Feb 6, 2022  5:05 PM Case was discussed with Dr Fam Morales and the patient's admission status was agreed to be Admission Status: observation status to the service of Dr Fam Morales          Follow-up Information     Follow up With Specialties Details Why Contact Info    Capri Nelson DO Family Medicine Schedule an appointment as soon as possible for a visit  [address] [place] [address],[address]  [address] [facility], [address]  [phone]            Current Discharge Medication List      START taking these medications    Details   lansoprazole (PREVACID SOLUTAB) 30 mg disintegrating tablet Take 1 tablet twice daily 30-60 minutes before meals until GI scope procedure  Then can take as directed by GI doctors  Qty: 90 tablet, Refills: 0    Associated Diagnoses: GI bleed      ondansetron (Zofran ODT) 4 mg disintegrating tablet Take 1 tablet (4 mg total) by mouth every 6 (six) hours as needed for nausea or vomiting  Qty: 20 tablet, Refills: 0    Associated Diagnoses: Nausea & vomiting           No discharge procedures on file  PDMP Review     None           ED Provider  Attending physically available and evaluated Sriramlanie Rohan EASTON managed the patient along with the ED Attending      Electronically Signed by         Meron Arnlod MD  02/06/22 9741

## 2022-02-06 NOTE — PLAN OF CARE
Problem: Potential for Falls  Goal: Patient will remain free of falls  Description: INTERVENTIONS:  - Educate patient/family on patient safety including physical limitations  - Instruct patient to call for assistance with activity   - Consult OT/PT to assist with strengthening/mobility   - Keep Call bell within reach  - Keep bed low and locked with side rails adjusted as appropriate  - Keep care items and personal belongings within reach  - Initiate and maintain comfort rounds  - Apply yellow socks and bracelet for high fall risk patients  - Consider moving patient to room near nurses station  2/6/2022 1117 by Sheri Rivas RN  Outcome: Progressing  2/6/2022 1117 by Sheri Rivas, RN  Outcome: Progressing

## 2022-02-06 NOTE — CONSULTS
Consult Service: Gastroenterology      PATIENT INFORMATION      William Sparks 62 y o  female MRN: [id number]  [facility] Encounter: [id number]  PCP: Eleazar Sousa DO  Date of Admission:  2/5/2022  Date of Consultation: 02/06/22  Requesting Physician: Lizeth Najera DO       ASSESSMENTS & PLAN   William Sparks is a 62 y o  old female with PMH of alcohol use who presents with nausea, vomiting, bloody stools since 2 days      Gastroenterology has been consulted for assistance with management of nausea, vomiting, melena/hematochezia    Nausea, vomiting, melena  · This could be secondary to an infectious colitis   · Patients symptoms have gotten much better with conservative management  · She does have a decline in her hemoglobin and I offered her an EGD and colonoscopy as an inpatient but she refused and said she would like this done as an outpatient   · Would be ideal to get stool studies as well but she is very keen on leaving the hospital   · We will plan for EGD and colonoscopy as an outpatient if her repeat hemoglobin is stable this afternoon   · She understands the risks of leaving the hospital and the importance of outpatient follow up  · Okay to continue PPI bid on discharge until we can do her procedures    Elevated liver enzymes  · AST: ALT = 2:1 ratio suspicious for ongoing alcohol use  · Patient reports using alcohol "socially" but she does think she drinks excessively at times  · Alcohol cessation advised   · CT abdomen pending, USG ordered but likely will not be done because she is being discharged  · Outpatient follow-up  HISTORY OF PRESENT ILLNESS      William Sparks is a 62 y o  female with a past medical history of alcohol use presents to the hospital with 2 days of nausea, vomiting, bloody stools  Patient reports that all the symptoms started 2 days ago after she ate an excessive amount of cabbage soup from a deli close to her house    She says that a few hours after eating, she started to experience nausea and vomiting and then had abdominal g  Subsequently she had 1 episode of very loose stools that was bright red in color  After that she continued to have dark stools that were loose as well  She reports that yesterday night was her last bowel movement which now started to turn brown  She reports that she lives at home alone along with her pets  She has never had a colonoscopy before  She reports that she wants to go home because she is feeling better because she has to take care of her pets  REVIEW OF SYSTEMS     A thorough 12-point review of systems has been conducted  Pertinent positives and negatives are mentioned in the history of present illness  PAST MEDICAL & SURGICAL HISTORY      Past Medical History:   Diagnosis Date    Metatarsal fracture     right 2nd and 3rd    Systolic click        No past surgical history on file        MEDICATIONS & ALLERGIES       Medications:   Prior to Admission medications    Not on File       Allergies: No Known Allergies      SOCIAL HISTORY      Marital Status: /Civil Union    Substance Use History:   Social History     Substance and Sexual Activity   Alcohol Use Not on file     Social History     Tobacco Use   Smoking Status Never Smoker   Smokeless Tobacco Never Used     Social History     Substance and Sexual Activity   Drug Use Not on file         FAMILY HISTORY      Non-Contributory      PHYSICAL EXAM     Vitals:   Blood Pressure: 124/76 (02/06/22 1532)  Pulse: 94 (02/06/22 1532)  Temperature: 98 9 °F (37 2 °C) (02/06/22 1532)  Temp Source: Oral (02/05/22 1856)  Respirations: 18 (02/06/22 1532)  Height: 5' 2" (157 5 cm) (02/05/22 1856)  Weight - Scale: 53 2 kg (117 lb 4 6 oz) (02/05/22 1856)  SpO2: 97 % (02/06/22 1532)    Physical Exam:   GENERAL: NAD  HEENT:  NC/AT, MMM  CARDIAC:  RRR, +S1/S2, no S3/S4 heard  PULMONARY:  CTA B/L, no wheezing/rales/rhonci, non-labored breathing  ABDOMEN:  Soft, NT/ND, +BS, no rebound/guarding/rigidity  DIGITAL RECTAL EXAM: not done   NEUROLOGIC:  Alert/oriented x3  SKIN:  No rashes or erythema       ADDITIONAL DATA     Lab Results:     Results from last 7 days   Lab Units 02/06/22  1547 02/06/22  0631 02/06/22  0631 02/05/22 2007 02/05/22  1222   WBC Thousand/uL  --   --  7 07  --  10 43*   HEMOGLOBIN g/dL 8 1*   < > 8 4*   < > 11 0*   HEMATOCRIT % 25 9*   < > 26 3*  --  33 8*   PLATELETS Thousands/uL  --   --   --   --  104*   NEUTROS PCT %  --   --  75  --  88*   LYMPHS PCT %  --   --  13*  --  6*   MONOS PCT %  --   --  8  --  4   EOS PCT %  --   --  2  --  0    < > = values in this interval not displayed  Results from last 7 days   Lab Units 02/06/22  0631   POTASSIUM mmol/L 3 6   CHLORIDE mmol/L 112*   CO2 mmol/L 26   BUN mg/dL 20   CREATININE mg/dL 0 50*   CALCIUM mg/dL 8 3   ALK PHOS U/L 89   ALT U/L 25   AST U/L 57*     Results from last 7 days   Lab Units 02/06/22  0631   INR  1 33*       Imaging:    No results found  EKG, Pathology, and Other Studies Reviewed on Admission:   · EKG: Reviewed      Counseling / Coordination of Care Time: 30 total mins spent n consult  Greater than 50% of total time spent on patient counseling and coordination of care  Tanya Aviles MD   PGY-4,   Gastroenterology         ** Please Note: This note is constructed using a voice recognition dictation system   **

## 2022-02-06 NOTE — PROGRESS NOTES
INTERNAL MEDICINE RESIDENCY PROGRESS NOTE     Name: Gaudencio Kelley   Age & Sex: 62 y o  female   MRN: [id number]  Unit/Bed#: [facility] 916-01   Encounter: [id number]  Team: SOD Team C     PATIENT INFORMATION     Name: Gaudencio Kelley   Age & Sex: 62 y o  female   MRN: [id number]  Hospital Stay Days: 0    ASSESSMENT/PLAN     Principal Problem:    GI bleed  Active Problems:    Anemia    Elevated liver enzymes      Elevated liver enzymes  Assessment & Plan  AST 83, ALT 38, T bili 2 18 noted on CMP on admission  INR 1 4  Admits to social alcohol use  Abdomen mildly distended on admission with mildly jaundiced skin  Has never had a colonoscopy  Etiology alcohol abuse vs malignancy  Hepatitis panels negative    Plan  - RUQ US  - PPI  - isolyte 125 cc/hr    Anemia  Assessment & Plan  Associated with loose melanotic stools for the last 2 days  Concern for underlying malignancy as patient reports pencil thin stools for 6 months, without fever/night sweats/weight loss, no prior colonoscopy  Unknown baseline Hb  Associated with mild conjunctival pallor  She is currently hemodynamically stable  Iron panel consistent with anemia of chronic disease with low TIBC and high ferritin  Lab Results   Component Value Date/Time    HGB 11 0 (L) 02/05/2022 12:22 PM     Plan  - Isolyte 125 cc/hr  - Trend hgb while inpt  - Consider GI follow up outpatient    * GI bleed  Assessment & Plan  Patient presents with loose melanotic stools for 2 days  She does have some associated abdominal pain  Of note, she states that her stools have been pencil caliber for the last 6 months  No associated fevers, night sweats, weight loss  She does report a one-time history of PUD that was NSAID-induced many years ago  EGD confirmed  Saw GI for this at the time but has not followed up with GI or a PCP in many years as she was asymptomatic  She has never had a colonoscopy  She does not have a family history of colon cancer   Lactate 3 5 - 2 2 with fluid bolus  She is currently hemodynamically stable  Plan  - CT abdomen pelvis  - RUQ US  - isolyte 125cc/hr  - PPI  - Clear liquid diet pending GI recs      Disposition:  Pending GI consult     SUBJECTIVE     Patient seen and examined  No acute events overnight  Patient reports pupils morning offers no complaints  She reports her stool is becoming less dark and is well formed  She denies any incontinence  At this time she also denies any abdominal pain, hematemesis, chest pain, shortness of breath, cough, fevers, chills, nausea, vomiting, headache, weakness, congestion, or sore throat       OBJECTIVE     Vitals:    22 1630 22 1856 22 2232 22 0800   BP: 129/64 126/73 128/75 125/76   BP Location:  Right arm     Pulse: 94 99 99 85   Resp: 18 20 16 18   Temp:  98 1 °F (36 7 °C) 98 8 °F (37 1 °C) 98 4 °F (36 9 °C)   TempSrc:  Oral     SpO2: 98% 97% 97% 99%   Weight:  53 2 kg (117 lb 4 6 oz)     Height:  5' 2" (1 575 m)        Temperature:   Temp (24hrs), Av 5 °F (36 9 °C), Min:98 1 °F (36 7 °C), Max:98 8 °F (37 1 °C)    Temperature: 98 4 °F (36 9 °C)  Intake & Output:  I/O       02/ 0701  02/05 0700 02/05 0701  02/06 0700 02/06 0701  02/07 0700    I V  (mL/kg)  1000 (18 8)     IV Piggyback  1100     Total Intake(mL/kg)  2100 (39 5)     Stool  2     Total Output  2     Net  +                Weights:        Body mass index is 21 45 kg/m²  Weight (last 2 days)     Date/Time Weight    22 18:56:12 53 2 (117 29)    22 1210 55 3 (122)        Physical Exam  Vitals and nursing note reviewed  Constitutional:       General: She is not in acute distress  Appearance: She is well-developed  HENT:      Head: Normocephalic and atraumatic  Nose: Nose normal       Mouth/Throat:      Mouth: Mucous membranes are moist       Pharynx: Oropharynx is clear  Eyes:      Extraocular Movements: Extraocular movements intact  Pupils: Pupils are equal, round, and reactive to light  Comments: Conjunctiva pallor   Cardiovascular:      Rate and Rhythm: Normal rate and regular rhythm  Pulses: Normal pulses  Heart sounds: Normal heart sounds  No murmur heard  Pulmonary:      Effort: Pulmonary effort is normal  No respiratory distress  Breath sounds: Normal breath sounds  Abdominal:      Palpations: Abdomen is soft  Tenderness: There is no abdominal tenderness  There is no guarding  Musculoskeletal:      Cervical back: Neck supple  Right lower leg: No edema  Left lower leg: No edema  Skin:     General: Skin is warm and dry  Capillary Refill: Capillary refill takes less than 2 seconds  Coloration: Skin is jaundiced  Neurological:      General: No focal deficit present  Mental Status: She is alert and oriented to person, place, and time  Psychiatric:         Mood and Affect: Mood normal          Behavior: Behavior normal          Thought Content: Thought content normal        LABORATORY DATA     Labs: I have personally reviewed pertinent reports  Results from last 7 days   Lab Units 02/05/22 2007 02/05/22  1222   WBC Thousand/uL  --  10 43*   HEMOGLOBIN g/dL 9 3* 11 0*   HEMATOCRIT %  --  33 8*   PLATELETS Thousands/uL  --  104*   NEUTROS PCT %  --  88*   MONOS PCT %  --  4      Results from last 7 days   Lab Units 02/06/22  0631 02/05/22  1222   POTASSIUM mmol/L 3 6 3 2*   CHLORIDE mmol/L 112* 108   CO2 mmol/L 26 24   BUN mg/dL 20 22   CREATININE mg/dL 0 50* 0 60   CALCIUM mg/dL 8 3 9 5   ALK PHOS U/L 89 128*   ALT U/L 25 38   AST U/L 57* 83*              Results from last 7 days   Lab Units 02/05/22  1331   INR  1 40*   PTT seconds 24     Results from last 7 days   Lab Units 02/05/22 2007   LACTIC ACID mmol/L 1 7           IMAGING & DIAGNOSTIC TESTING     Radiology Results: I have personally reviewed pertinent reports  No results found  Other Diagnostic Testing: I have personally reviewed pertinent reports      ACTIVE MEDICATIONS Current Facility-Administered Medications   Medication Dose Route Frequency    multi-electrolyte (PLASMALYTE-A/ISOLYTE-S PH 7 4) IV solution  125 mL/hr Intravenous Continuous    ondansetron (ZOFRAN) injection 4 mg  4 mg Intravenous Q6H PRN    pantoprazole (PROTONIX) injection 40 mg  40 mg Intravenous Q12H       VTE Pharmacologic Prophylaxis:  Held in the setting of GI bleed  VTE Mechanical Prophylaxis: SCD    Portions of the record may have been created with voice recognition software  Occasional wrong word or "sound a like" substitutions may have occurred due to the inherent limitations of voice recognition software    Read the chart carefully and recognize, using context, where substitutions have occurred   ==  Delaney Tristan MD  [facility]  Internal Medicine Residency PGY-1

## 2022-02-08 ENCOUNTER — TELEPHONE (OUTPATIENT)
Dept: GASTROENTEROLOGY | Facility: CLINIC | Age: 62
End: 2022-02-08

## 2022-02-08 NOTE — TELEPHONE ENCOUNTER
----- Message from Sheridan Amezquita MD sent at 2/6/2022  4:33 PM EST -----  Please arrange for expedited EGD/colonoscopy at the earliest possible time with any provider  She has a GI bleed but left the hospital and wanted her procedures as an outpatient   Thank-you (procedures ordered)    Sheridan Amezquita MD  Gastroenterology Fellow

## 2022-02-08 NOTE — TELEPHONE ENCOUNTER
LVM to contact the office regarding EGD/Colonoscopy scheduled on 3/3  Marlon rachel to schedule in RM 3 at 12:15

## 2022-02-11 NOTE — TELEPHONE ENCOUNTER
Patient called back confirming she received my messages  States she's declining procedures at this time

## 2022-02-24 ENCOUNTER — TELEPHONE (OUTPATIENT)
Dept: GASTROENTEROLOGY | Facility: CLINIC | Age: 62
End: 2022-02-24

## 2022-02-24 NOTE — TELEPHONE ENCOUNTER
----- Message from Yane Mederos MD sent at 2/24/2022  8:16 AM EST -----  Clerical staff: This patient was seen by our team for GI bleeding  One of the medicine resident sent me a message regarding his CT scan showing enteritis and cirrhosis  Please call him and schedule follow-up with 1 of us for further evaluation  I saw Flory Sanchez is note that she declined EGD and colonoscopy  Please call the patient and document patient's preference in the chart  Thank you,  Nickie Oro  ----- Message -----  From: Leni Jarrett DO  Sent: 2/9/2022   1:01 PM EST  To: Yane Mederos MD    Hi, I see she has a follow up appointment with you in March  Just wanted to make you aware in case you wanted to see her sooner  Thanks!

## 2022-08-17 ENCOUNTER — OFFICE VISIT (OUTPATIENT)
Dept: FAMILY MEDICINE CLINIC | Facility: CLINIC | Age: 62
End: 2022-08-17
Payer: COMMERCIAL

## 2022-08-17 VITALS
DIASTOLIC BLOOD PRESSURE: 80 MMHG | OXYGEN SATURATION: 98 % | TEMPERATURE: 97.8 F | BODY MASS INDEX: 21.79 KG/M2 | SYSTOLIC BLOOD PRESSURE: 132 MMHG | HEIGHT: 62 IN | RESPIRATION RATE: 16 BRPM | WEIGHT: 118.4 LBS | HEART RATE: 62 BPM

## 2022-08-17 DIAGNOSIS — Z59.9 FINANCIAL DIFFICULTIES: ICD-10-CM

## 2022-08-17 DIAGNOSIS — K74.69 OTHER CIRRHOSIS OF LIVER (HCC): Primary | ICD-10-CM

## 2022-08-17 DIAGNOSIS — D64.9 ANEMIA, UNSPECIFIED TYPE: ICD-10-CM

## 2022-08-17 DIAGNOSIS — Z00.00 ANNUAL PHYSICAL EXAM: ICD-10-CM

## 2022-08-17 PROCEDURE — 3725F SCREEN DEPRESSION PERFORMED: CPT | Performed by: FAMILY MEDICINE

## 2022-08-17 PROCEDURE — 99396 PREV VISIT EST AGE 40-64: CPT | Performed by: FAMILY MEDICINE

## 2022-08-17 SDOH — ECONOMIC STABILITY - INCOME SECURITY: PROBLEM RELATED TO HOUSING AND ECONOMIC CIRCUMSTANCES, UNSPECIFIED: Z59.9

## 2022-08-17 NOTE — PATIENT INSTRUCTIONS

## 2022-08-17 NOTE — PROGRESS NOTES
[address]    NAME: William Sparks  AGE: 65 y o  SEX: female  : 1960     DATE: 2022     Assessment and Plan:     Problem List Items Addressed This Visit        Digestive    Other cirrhosis of liver ([address] ) - Primary     Recent hospitalization for related complications including anemia and GI bleed  Will recheck CBC at this time to ensure resolution since she sometimes feels weak  She would like to follow with [facility] - referral provided  Follow up when results available         Relevant Orders    Comprehensive metabolic panel    Ambulatory Referral to Gastroenterology       Other    Anemia     Denies melena, hematochezia  CBC last checked while inpatient, will recheck now  GI consult placed  Relevant Orders    CBC and differential      Other Visit Diagnoses     Financial difficulties        Relevant Orders    Ambulatory Referral to Social Work Care Management Program    Annual physical exam        Relevant Orders    HIV 1/2 Antigen/Antibody (4th Generation) w Reflex [facility]    Mammo screening bilateral w 3d & cad    Lipid Panel with Direct LDL reflex          Immunizations and preventive care screenings were discussed with patient today  Appropriate education was printed on patient's after visit summary  Counseling:  Alcohol/drug use: discussed moderation in alcohol intake, the recommendations for healthy alcohol use, and avoidance of illicit drug use  Dental Health: discussed importance of regular tooth brushing, flossing, and dental visits  Exercise: the importance of regular exercise/physical activity was discussed  Recommend exercise 3-5 times per week for at least 30 minutes  Return in 3 months (on 2022)       Chief Complaint:     Chief Complaint   Patient presents with   [facility] Patient Visit     Establish care      History of Present Illness:     Adult Annual Physical   Patient here for a comprehensive physical exam  The patient reports problems - housing instability and financial hardship  She is facing eviction from the home she has lived in for 28 years  She was recently hospitalized for complications surrounding alcoholic cirrhosis and was behind on rent  She is searching for new housing and would like assistance from social work if any is available  She also has a [place] ramos dog which limits her housing options, and she is requesting a therapy pet letter  Diet and Physical Activity  Diet/Nutrition: well balanced diet  Abstains from alcohol  Exercise: no formal exercise  Depression Screening  PHQ-2/9 Depression Screening    Little interest or pleasure in doing things: 0 - not at all  Feeling down, depressed, or hopeless: 0 - not at all  PHQ-2 Score: 0  PHQ-2 Interpretation: Negative depression screen       General Health  Sleep: sleeps well  8-9H per night  Hearing: normal - bilateral   Vision: no vision problems and previous LASIK surgery  Dental: no dental visits for >1 year  /GYN Health  Patient is: postmenopausal       Review of Systems:     Review of Systems   Constitutional: Negative for appetite change, chills, fatigue and fever  HENT: Negative for congestion  Respiratory: Negative for cough and shortness of breath  Cardiovascular: Negative for chest pain  Gastrointestinal: Negative for abdominal pain  Musculoskeletal: Negative for back pain        Past Medical History:     Past Medical History:   Diagnosis Date    Environmental allergies     Metatarsal fracture     right 2nd and 3rd    Systolic click       Past Surgical History:     Past Surgical History:   Procedure Laterality Date    NO PAST SURGERIES        Social History:     Social History     Socioeconomic History    Marital status: /Civil Union     Spouse name: None    Number of children: None    Years of education: None    Highest education level: None   Occupational History  None   Tobacco Use    Smoking status: Never Smoker    Smokeless tobacco: Never Used   Vaping Use    Vaping Use: Never used   Substance and Sexual Activity    Alcohol use: Not Currently    Drug use: Never    Sexual activity: None   Other Topics Concern    None   Social History Narrative    None     Social Determinants of Health     Financial Resource Strain: Not on file   Food Insecurity: Not on file   Transportation Needs: Not on file   Physical Activity: Not on file   Stress: Not on file   Social Connections: Not on file   Intimate Partner Violence: Not on file   Housing Stability: Not on file      Family History:     Family History   Problem Relation Age of Onset    Heart disease Mother     Mitral valve prolapse Mother     Lung cancer Mother     Heart attack Father     Tuberculosis Father     Hypertension Family     Cancer Family       Current Medications:     Current Outpatient Medications   Medication Sig Dispense Refill    lansoprazole (PREVACID SOLUTAB) 30 mg disintegrating tablet Take 1 tablet twice daily 30-60 minutes before meals until GI scope procedure  Then can take as directed by GI doctors  (Patient not taking: No sig reported) 90 tablet 0    ondansetron (Zofran ODT) 4 mg disintegrating tablet Take 1 tablet (4 mg total) by mouth every 6 (six) hours as needed for nausea or vomiting (Patient not taking: No sig reported) 20 tablet 0     No current facility-administered medications for this visit  Allergies: Allergies   Allergen Reactions    Penicillins Other (See Comments)    Sulfa Antibiotics Other (See Comments)      Physical Exam:     /80 (BP Location: Left arm, Patient Position: Sitting, Cuff Size: Standard)   Pulse 62   Temp 97 8 °F (36 6 °C) (Temporal)   Resp 16   Ht 5' 2" (1 575 m)   Wt 53 7 kg (118 lb 6 4 oz)   SpO2 98%   BMI 21 66 kg/m²     Physical Exam  Vitals reviewed  Constitutional:       Appearance: Normal appearance     HENT:      Nose: Nose normal    Eyes:      Extraocular Movements: Extraocular movements intact  Conjunctiva/sclera: Conjunctivae normal    Cardiovascular:      Rate and Rhythm: Normal rate and regular rhythm  Pulses: Normal pulses  Heart sounds: Normal heart sounds  Pulmonary:      Effort: Pulmonary effort is normal       Breath sounds: Normal breath sounds  Abdominal:      General: Abdomen is flat  Palpations: Abdomen is soft  Skin:     General: Skin is warm and dry  Neurological:      General: No focal deficit present  Mental Status: She is alert and oriented to person, place, and time     Psychiatric:         Mood and Affect: Mood normal           Clayton Signs, DO  [facility]

## 2022-08-18 NOTE — ASSESSMENT & PLAN NOTE
Recent hospitalization for related complications including anemia and GI bleed  Will recheck CBC at this time to ensure resolution since she sometimes feels weak  She would like to follow with [facility] - referral provided  Follow up when results available

## 2022-08-19 ENCOUNTER — PATIENT OUTREACH (OUTPATIENT)
Dept: FAMILY MEDICINE CLINIC | Facility: CLINIC | Age: 62
End: 2022-08-19

## 2022-08-19 NOTE — PROGRESS NOTES
Referral received from PCP with request for OP SWCM to outreach patient and assist with housing need  Per PCP, patient is facing eviction; patient has lived in her home for 28 years, was hospitalized & unable to work for extended period of time, and was unable to pay her rent  Patient also has [place]; patient requested letter from PCP stating patient's dog is a therapy dog but PCP unable to provide letter at this time  Patient resides in [facility]  Call placed to discuss housing options and rental assistance through [facility]  No answer as phone went directly to voicemail    Message left and awaiting return call to further assist

## 2022-08-22 ENCOUNTER — PATIENT OUTREACH (OUTPATIENT)
Dept: FAMILY MEDICINE CLINIC | Facility: CLINIC | Age: 62
End: 2022-08-22

## 2022-08-22 NOTE — PROGRESS NOTES
Message received from patient in response to OP SWCM's initial outreach to assess housing needs  Patient confirmed she received eviction notice with court date, is working with  pro [employer], was working with  as well, [facility] that patient is not out of home after the 10 days, and patient will then receive final eviction notice  Patient works from 8:30AM-5PM with lunch break around [address]  Patient placed application online with Shaylee HAWKINS) today after talking with [employer] rep (case # [id number])  Patient was told that rep will reach out by Wednesday after reviewing information and eviction notice in order to discuss funding for back rent vs deposit on new apartment/home  Patient has lived in current home for 28 years but would like to move on to another apartment/house as property is difficult to maintain  Patient owes back-rent from February to June of 2022  Patient faced financial difficulty after having COVID twice, being furloughed, and being hospitalized  Current rent is $1,550/month  Patient denied [facility] team assisting with additional housing applications at this time  Encouraged patient to call Marianela Herman at 211  Patient states if she is evicted and apartment is not set up, she will stay in her car and/or go to a campground  She is refusing to go to shelter  Patient works from home and has wifi access through her car, which she can use if needed  Patient is working with [employer] to find new home/apartment but is having difficulty due to having a [place] dog and 2 cats  Patient asked PCP for letter stating her dog is a therapy dog; PCP unable to provide this  Patient asked OP SWCM about this as well; will relay to PCP to inform patient is requesting again    Patient does not have mental health diagnoses, states dog helps with her stress, and she started therapy dog training for her dog but did not complete this training  Patient has apartment pending in [facility] but they are awaiting verification of information from patient's current landlord and amount of time she has been at current home  OP SWCM will follow-up later this week to check status and ask if she received new information from [employer]

## 2022-08-26 ENCOUNTER — PATIENT OUTREACH (OUTPATIENT)
Dept: FAMILY MEDICINE CLINIC | Facility: CLINIC | Age: 62
End: 2022-08-26

## 2022-08-26 NOTE — PROGRESS NOTES
Call placed to patient to check status and ask if she received update from the [facility] regarding rent and upcoming deposit for possible new apartment  Will also ask if she contacted [employer] [address]  No answer, voicemail left, and awaiting return call

## 2022-08-29 ENCOUNTER — PATIENT OUTREACH (OUTPATIENT)
Dept: FAMILY MEDICINE CLINIC | Facility: CLINIC | Age: 62
End: 2022-08-29

## 2022-08-29 NOTE — PROGRESS NOTES
Call received from patient  She has outreached free  services through [facility] office  Provided patient with [employer] contact information as well in order to seek additional legal support  Patient will call and will check status of [employer] application online

## 2022-08-29 NOTE — PROGRESS NOTES
Voicemail received from patient stating she has not received an update from [facility]   Patient has not called Marianela Virgil yet but will do this  OP [facility] placed call to [employer] [phone] to check status of patient's application  Spoke with [employer] rep who stated patient's case has been assigned to a  who will review the information today or tomorrow  Patient will be notified by email if changes are needed to initial application  If no changes are needed, [employer] will contact patient's landlord for next steps/information  Status will change from under review to pending applicant information to pending supervisor review to pending payment  Payment is determined by the county  Detailed voicemail left for patient to inform of the above information  Will continue to follow

## 2022-09-06 ENCOUNTER — PATIENT OUTREACH (OUTPATIENT)
Dept: FAMILY MEDICINE CLINIC | Facility: CLINIC | Age: 62
End: 2022-09-06

## 2022-09-06 NOTE — PROGRESS NOTES
Call placed to patient to check status and ask if she was able to receive any legal assistance through [employer]  Will also check status of [employer] application with patient  No answer, voicemail left, and awaiting return call

## 2022-09-07 ENCOUNTER — PATIENT OUTREACH (OUTPATIENT)
Dept: FAMILY MEDICINE CLINIC | Facility: CLINIC | Age: 62
End: 2022-09-07

## 2022-09-07 NOTE — PROGRESS NOTES
Message received from patient stating [employer] is unable to assist as she resides in [facility]  Patient applied to the Housing Link through [facility] and is working with Roberto Stephen who stated he is needing patient's paystubs in order to review salary information  Patient may over-qualify due to income guidelines through the [facility] but Ramila Dc will look at months when patient was only receiving disability payments  AJ aware of the apartment patient would like to move into and patient is trying to get unsecured loan for this apartment  Patient is awaiting return call from [employer]ila D[employer] with update regarding her case  Patient will need to be out of her apartment by today; she will be living out of her car  Patient asked if OP SWCM could outreach these community agencies to check status in attempt to expedite the process  Call placed to [employer] [phone]  Spoke with Wicho Malcolm who stated assigned  is Lucy Gaines [phone] (Case ID # [id number])  Lucy Gaines attempted to outreach patient via calls and emails  Email provides information to patient on needed documents in order for [employer] to assist   OP SWCM confirmed [employer] has correct phone # for patient and email on file is [email or web address]  Call placed to the [facility] [phone] with request to speak with AJ  No answer, voicemail left, and awaiting return call  Requested AJ call patient directly with any updates on her case as well  Voicemail left for patient; provided update on above information

## 2022-09-08 ENCOUNTER — PATIENT OUTREACH (OUTPATIENT)
Dept: FAMILY MEDICINE CLINIC | Facility: CLINIC | Age: 62
End: 2022-09-08

## 2022-09-08 NOTE — PROGRESS NOTES
Voicemail received from [facility] stating Nikia Bose can be reached at [phone] x [phone] through the [id number] [place] [address] in [facility] stated Nikia Bose has attempted to reach patient but has not had success with this  OP [facility] left voicemail for AJ in attempt to assist and check status of patient's case  Awaiting return call  Voicemail left for patient with above information and encouraging patient to use lunch break during work to make calls to Nikia Bose

## 2022-09-09 ENCOUNTER — PATIENT OUTREACH (OUTPATIENT)
Dept: FAMILY MEDICINE CLINIC | Facility: CLINIC | Age: 62
End: 2022-09-09

## 2022-09-09 NOTE — PROGRESS NOTES
Return call received from patient who stated she left multiple messages for AJ with [facility] in [facility] but has not received return calls  She has not received emails either; she has checked her [facility] emails as well  Encouraged patient to continue outreach attempts to [facility] has needed paystubs and information to show when patient receives disability payments in order to see if she will qualify for rental assistance  Patient may be over income limit but [facility] will review this information  Patient is living out of her car and moved her belongings into a barn with the help of her friends  Patient has wifi in her car and is able to work from there  OP [facility] will relay any updates to patient if return call is received from [facility] as well  Patient appreciative

## 2022-09-16 ENCOUNTER — PATIENT OUTREACH (OUTPATIENT)
Dept: FAMILY MEDICINE CLINIC | Facility: CLINIC | Age: 62
End: 2022-09-16

## 2022-09-16 NOTE — PROGRESS NOTES
No return call received from [address] through [facility] in [facility]  Call placed to patient to ask if she has been able to speak with AJ about rental assistance eligibility  No answer, voicemail left, encouraged patient to continue outreach attempts to [place] [address], and awaiting return call

## 2022-09-23 ENCOUNTER — PATIENT OUTREACH (OUTPATIENT)
Dept: FAMILY MEDICINE CLINIC | Facility: CLINIC | Age: 62
End: 2022-09-23

## 2022-09-23 NOTE — PROGRESS NOTES
2nd outreach attempt to patient to ask if she has been able to speak with AJ about rental assistance eligibility  No answer, voicemail left, encouraged patient to continue outreach attempts to [facility], and awaiting return call

## 2022-09-27 ENCOUNTER — PATIENT OUTREACH (OUTPATIENT)
Dept: FAMILY MEDICINE CLINIC | Facility: CLINIC | Age: 62
End: 2022-09-27

## 2022-09-27 NOTE — PROGRESS NOTES
Message received from patient stating Shahzad Monsivais informed her that she is ineligible for rental assistance through [id number] [place] [address] in [facility] as she exceeds income limit  Shahzad Monsivais referred patient to another [facility]; she is working with Lyle Reyes and will be meeting with patient to provide/sign needed documents  Will follow

## 2022-10-07 ENCOUNTER — PATIENT OUTREACH (OUTPATIENT)
Dept: FAMILY MEDICINE CLINIC | Facility: CLINIC | Age: 62
End: 2022-10-07

## 2022-10-07 NOTE — PROGRESS NOTES
Message left for patient to check status and ask if she was able to meet with Kezia Quinteros through [employer]  program to complete/provide needed documents to move forward with housing/rental assistance options  Awaiting return call

## 2022-10-07 NOTE — PROGRESS NOTES
Return call received from patient stating she spoke with Darren Alejandro ([email or web address] , [phone]) who works through the [employer] [facility]   She met with Myah Ron on Monday and completed paperwork/provided documents in order to be be approved to receive a security deposit and 1st/last month's rent  Patient is responsible for finding an apartment or house to rent, per Myah Ron  Once she has found this, he will try to obtain funding for the needed deposits  Patient is working with [employer] to find a single family dwelling that she can rent and allows pets  Patient is over income for [facility]   Patient will continue to work with [employer] and [employer]   Will remain available to assist as needed

## 2022-10-24 ENCOUNTER — PATIENT OUTREACH (OUTPATIENT)
Dept: FAMILY MEDICINE CLINIC | Facility: CLINIC | Age: 62
End: 2022-10-24

## 2022-10-24 NOTE — PROGRESS NOTES
Call received from patient stating she is needing help in reaching out to Chely Ames [phone] through the [employer] to ensure paystubs were received as patient has not received return calls  Patient also states she found a house over the weekend and is anticipated to move in on 11/15  Patient has been attempting to reach Lidia Glaser to discuss obtaining 1st/last month's rent and security deposit as he stated the [facility] would be able to assist with this  Patient states payment needed would be $1,100  Encouraged patient to consider a back-up plan if unable to reach Lidia Glaser  Patient states she would be able to use her next paystub to pay but will then have to use credit card for other monthly expenses until next payment is received  Call placed to Lidia Glaser  Mailbox is full  Call placed to [facility] main number [phone]  Mailbox is full  Secure email sent to Lidia Glaser  Requested prompt call to patient to discuss 1st/last month's rent and security deposit as move-in date is known  Awaiting response  Left voicemail for patient informing of this and requesting she call OP [facility] once she receives call from Lidia Glaser

## 2022-10-25 ENCOUNTER — PATIENT OUTREACH (OUTPATIENT)
Dept: FAMILY MEDICINE CLINIC | Facility: CLINIC | Age: 62
End: 2022-10-25

## 2022-10-25 NOTE — PROGRESS NOTES
Return email received from Adriana with the [facility] stating he was able to outreach patient to discuss rental assistance  Adriana informed patient that he would be able to provide the needed $1,100 but it would take roughly 2 weeks to secure the check  Patient informed Adriana she would need check before then and will choose to pay out of her own pocket  Patient then asked if the [employer] Program could assist with $550, which is due on 11/15  Adriana agreed to this but stated he would need information for patient's landlord  Patient informed Adriana she will pay for this out of her own pocket as well due to the process  Voicemail left for patient to inform of email received from Adriana and to provide information on these updates  Encouraged patient to call if additional assistance is needed

## 2022-12-06 ENCOUNTER — PATIENT OUTREACH (OUTPATIENT)
Dept: FAMILY MEDICINE CLINIC | Facility: CLINIC | Age: 62
End: 2022-12-06

## 2022-12-06 NOTE — PROGRESS NOTES
This SW received message from [facility] requesting outreach to pt  SW reviewed chart  OPSW Martir Woody assisted pt over the last few months in locating housing  Pt was working with Barb Pizarro from the [facility] and also a realtor, and per Silvino Cesar, last outreach was a voicemail left to pt to contact Silvino Cesar if pt has any additional needs  SW called pt, reached voicemail and requested call back if she needs anything from SW

## 2022-12-07 ENCOUNTER — PATIENT OUTREACH (OUTPATIENT)
Dept: FAMILY MEDICINE CLINIC | Facility: CLINIC | Age: 62
End: 2022-12-07

## 2022-12-07 NOTE — PROGRESS NOTES
Message from pt requesting call back  SW called pt, reached voicemail and left message requesting call back

## 2023-05-03 DIAGNOSIS — Z12.31 SCREENING MAMMOGRAM FOR BREAST CANCER: Primary | ICD-10-CM

## 2024-08-09 ENCOUNTER — TELEPHONE (OUTPATIENT)
Dept: FAMILY MEDICINE CLINIC | Facility: CLINIC | Age: 64
End: 2024-08-09

## 2024-08-18 NOTE — TELEPHONE ENCOUNTER
08/17/24 10:52 PM        The office's request has been received, reviewed, and the patient chart updated. The PCP has successfully been removed with a patient attribution note. This message will now be completed.        Thank you  Sylvester Montana